# Patient Record
Sex: FEMALE | Race: WHITE | URBAN - METROPOLITAN AREA
[De-identification: names, ages, dates, MRNs, and addresses within clinical notes are randomized per-mention and may not be internally consistent; named-entity substitution may affect disease eponyms.]

---

## 2017-11-21 NOTE — TELEPHONE ENCOUNTER
Called pt and l/m regarding her message this morning. Advised pt Dr. Kathy Castro could not refill medication due to being a new office. Pt would need to contact pcp. Thanks!

## 2018-01-22 ENCOUNTER — OFFICE VISIT (OUTPATIENT)
Dept: ENDOCRINOLOGY | Age: 33
End: 2018-01-22

## 2018-01-22 VITALS
SYSTOLIC BLOOD PRESSURE: 98 MMHG | HEART RATE: 53 BPM | DIASTOLIC BLOOD PRESSURE: 62 MMHG | OXYGEN SATURATION: 99 % | WEIGHT: 147.2 LBS | BODY MASS INDEX: 21.8 KG/M2 | HEIGHT: 69 IN

## 2018-01-22 DIAGNOSIS — E03.9 ACQUIRED HYPOTHYROIDISM: Primary | ICD-10-CM

## 2018-01-22 PROCEDURE — G8427 DOCREV CUR MEDS BY ELIG CLIN: HCPCS | Performed by: INTERNAL MEDICINE

## 2018-01-22 PROCEDURE — 1036F TOBACCO NON-USER: CPT | Performed by: INTERNAL MEDICINE

## 2018-01-22 PROCEDURE — 99202 OFFICE O/P NEW SF 15 MIN: CPT | Performed by: INTERNAL MEDICINE

## 2018-01-22 PROCEDURE — G8484 FLU IMMUNIZE NO ADMIN: HCPCS | Performed by: INTERNAL MEDICINE

## 2018-01-22 PROCEDURE — G8420 CALC BMI NORM PARAMETERS: HCPCS | Performed by: INTERNAL MEDICINE

## 2018-01-22 RX ORDER — LEVOTHYROXINE SODIUM 137 UG/1
137 TABLET ORAL DAILY
COMMUNITY
End: 2018-02-07 | Stop reason: DRUGHIGH

## 2018-01-22 ASSESSMENT — PATIENT HEALTH QUESTIONNAIRE - PHQ9
SUM OF ALL RESPONSES TO PHQ9 QUESTIONS 1 & 2: 0
2. FEELING DOWN, DEPRESSED OR HOPELESS: 0
1. LITTLE INTEREST OR PLEASURE IN DOING THINGS: 0
SUM OF ALL RESPONSES TO PHQ QUESTIONS 1-9: 0

## 2018-01-22 NOTE — PROGRESS NOTES
vision, no eye redness, no eye irritation, no double vision  Ears, nose, throat: no nasal congestion, no sore throat, no earache, no decrease in hearing, no hoarseness, no dry mouth, no sinus problems, no difficulty swallowing, no neck lumps, no dental problems, no mouth sores, no ringing in ears  Pulmonary: no shortness of breath, no wheezing, no dyspnea on exertion, no cough  Cardiovascular: no chest pain, no lower extremity edema, no orthopnea, no intermittent leg claudication, no palpitations  Gastrointestinal: no abdominal pain, no nausea, no vomiting, no diarrhea, no constipation, no heartburn, no bloating  Genitourinary: no dysuria, no urinary incontinence, no urinary hesitancy, no change in urinary frequency, no feelings of urinary urgency, no nocturia  Musculoskeletal: no joint swelling, no joint stiffness, no joint pain, no muscle cramps, no muscle pain, no bone pain, no fractures  Integument/Breast: no hair loss, but no skin rashes, no skin lesions, no itching, no dry skin, no breast pain, no breast mass, no skin hives, no skin discoloration, no nipple discharge  Neurological: no numbness, no tingling, no weakness, no confusion, no headaches, no dizziness, no fainting, no tremors, no decrease in memory, no balance problems  Psychiatric: no anxiety, no depression, no insomnia, no change in personality, no emotional problems, no stress  Hematologic/Lymphatic: no tendency for easy bleeding, no swollen lymph nodes, no tendency for easy bruising  Immunology: no seasonal allergies, no frequent infections, no frequent illnesses  Endocrine: no temperature intolerance, no hot flashes, no hand tremor    OBJECTIVE:   BP 98/62 (Site: Left Arm, Position: Sitting, Cuff Size: Medium Adult)   Pulse 53   Ht 5' 9\" (1.753 m)   Wt 147 lb 3.2 oz (66.8 kg)   LMP  (LMP Unknown)   SpO2 99%   Breastfeeding?  No   BMI 21.74 kg/m²   Wt Readings from Last 3 Encounters:   01/22/18 147 lb 3.2 oz (66.8 kg)       Physical

## 2018-02-02 ENCOUNTER — TELEPHONE (OUTPATIENT)
Dept: ENDOCRINOLOGY | Age: 33
End: 2018-02-02

## 2018-02-07 ENCOUNTER — TELEPHONE (OUTPATIENT)
Dept: ENDOCRINOLOGY | Age: 33
End: 2018-02-07

## 2018-02-07 DIAGNOSIS — E03.9 ACQUIRED HYPOTHYROIDISM: Primary | ICD-10-CM

## 2018-02-07 RX ORDER — LEVOTHYROXINE SODIUM 0.15 MG/1
150 TABLET ORAL DAILY
Qty: 30 TABLET | Refills: 3 | Status: SHIPPED | OUTPATIENT
Start: 2018-02-07 | End: 2018-03-19 | Stop reason: SDUPTHER

## 2018-03-19 ENCOUNTER — OFFICE VISIT (OUTPATIENT)
Dept: ENDOCRINOLOGY | Age: 33
End: 2018-03-19

## 2018-03-19 VITALS
HEART RATE: 54 BPM | OXYGEN SATURATION: 100 % | WEIGHT: 147.4 LBS | BODY MASS INDEX: 21.83 KG/M2 | SYSTOLIC BLOOD PRESSURE: 118 MMHG | DIASTOLIC BLOOD PRESSURE: 72 MMHG | HEIGHT: 69 IN

## 2018-03-19 DIAGNOSIS — E03.9 ACQUIRED HYPOTHYROIDISM: ICD-10-CM

## 2018-03-19 PROCEDURE — 99213 OFFICE O/P EST LOW 20 MIN: CPT | Performed by: INTERNAL MEDICINE

## 2018-03-19 RX ORDER — LEVOTHYROXINE SODIUM 0.15 MG/1
TABLET ORAL
Qty: 40 TABLET | Refills: 3 | Status: SHIPPED | OUTPATIENT
Start: 2018-03-19 | End: 2018-06-19 | Stop reason: SDUPTHER

## 2018-03-19 ASSESSMENT — PATIENT HEALTH QUESTIONNAIRE - PHQ9
SUM OF ALL RESPONSES TO PHQ QUESTIONS 1-9: 0
1. LITTLE INTEREST OR PLEASURE IN DOING THINGS: 0
SUM OF ALL RESPONSES TO PHQ9 QUESTIONS 1 & 2: 0
2. FEELING DOWN, DEPRESSED OR HOPELESS: 0

## 2018-06-18 ENCOUNTER — TELEPHONE (OUTPATIENT)
Dept: ENDOCRINOLOGY | Age: 33
End: 2018-06-18

## 2018-06-19 ENCOUNTER — OFFICE VISIT (OUTPATIENT)
Dept: ENDOCRINOLOGY | Age: 33
End: 2018-06-19

## 2018-06-19 VITALS
HEIGHT: 69 IN | DIASTOLIC BLOOD PRESSURE: 62 MMHG | SYSTOLIC BLOOD PRESSURE: 102 MMHG | HEART RATE: 58 BPM | WEIGHT: 148.8 LBS | BODY MASS INDEX: 22.04 KG/M2 | OXYGEN SATURATION: 98 %

## 2018-06-19 DIAGNOSIS — E03.9 ACQUIRED HYPOTHYROIDISM: Primary | ICD-10-CM

## 2018-06-19 PROCEDURE — 99213 OFFICE O/P EST LOW 20 MIN: CPT | Performed by: INTERNAL MEDICINE

## 2018-06-19 RX ORDER — LEVOTHYROXINE SODIUM 0.15 MG/1
TABLET ORAL
Qty: 40 TABLET | Refills: 3 | Status: SHIPPED | OUTPATIENT
Start: 2018-06-19 | End: 2018-09-24 | Stop reason: DRUGHIGH

## 2018-06-19 ASSESSMENT — PATIENT HEALTH QUESTIONNAIRE - PHQ9
2. FEELING DOWN, DEPRESSED OR HOPELESS: 0
SUM OF ALL RESPONSES TO PHQ9 QUESTIONS 1 & 2: 0
1. LITTLE INTEREST OR PLEASURE IN DOING THINGS: 0
SUM OF ALL RESPONSES TO PHQ QUESTIONS 1-9: 0

## 2018-09-24 ENCOUNTER — OFFICE VISIT (OUTPATIENT)
Dept: ENDOCRINOLOGY | Age: 33
End: 2018-09-24
Payer: COMMERCIAL

## 2018-09-24 VITALS
HEART RATE: 49 BPM | DIASTOLIC BLOOD PRESSURE: 76 MMHG | BODY MASS INDEX: 22.31 KG/M2 | WEIGHT: 150.6 LBS | OXYGEN SATURATION: 99 % | HEIGHT: 69 IN | SYSTOLIC BLOOD PRESSURE: 108 MMHG

## 2018-09-24 DIAGNOSIS — E03.9 ACQUIRED HYPOTHYROIDISM: Primary | ICD-10-CM

## 2018-09-24 PROCEDURE — 99213 OFFICE O/P EST LOW 20 MIN: CPT | Performed by: INTERNAL MEDICINE

## 2018-09-24 RX ORDER — LEVOTHYROXINE SODIUM 0.15 MG/1
TABLET ORAL
Qty: 40 TABLET | Refills: 3 | Status: CANCELLED | OUTPATIENT
Start: 2018-09-24

## 2018-09-24 RX ORDER — LEVOTHYROXINE SODIUM 175 UG/1
175 TABLET ORAL
Qty: 30 TABLET | Refills: 3 | Status: SHIPPED | OUTPATIENT
Start: 2018-09-24 | End: 2018-10-31 | Stop reason: SDUPTHER

## 2018-09-24 ASSESSMENT — PATIENT HEALTH QUESTIONNAIRE - PHQ9
1. LITTLE INTEREST OR PLEASURE IN DOING THINGS: 0
SUM OF ALL RESPONSES TO PHQ QUESTIONS 1-9: 0
SUM OF ALL RESPONSES TO PHQ QUESTIONS 1-9: 0
SUM OF ALL RESPONSES TO PHQ9 QUESTIONS 1 & 2: 0
2. FEELING DOWN, DEPRESSED OR HOPELESS: 0

## 2018-09-24 NOTE — PROGRESS NOTES
SUBJECTIVE:  Kendrick Duffy is a 26 y.o. female who is here for hypothyroidism. 1. Acquired hypothyroidism    This started in 2011. Patient was diagnosed with hypothyroidism. The problem has been unchanged. Patient started medication in 2011. Currently patient is on: levothyroxine. Misses  0 doses a month. Current complaints: fatigue. Moderate. History of obstructive symptoms: difficulty swallowing No, changes in voice/hoarseness No.    History of radiation to patient's neck: No  Resent iodine exposure: No  Family history includes hypothyroidism. Family history of thyroid cancer: No    Saw reproductive endocrinologist.  Cost very expensive. Past Medical History:   Diagnosis Date    Hypothyroidism      Patient Active Problem List    Diagnosis Date Noted    Acquired hypothyroidism 03/19/2018     Past Surgical History:   Procedure Laterality Date    FERTILITY SURGERY       Family History   Problem Relation Age of Onset    Thyroid Disease Mother     No Known Problems Father     No Known Problems Brother     No Known Problems Son      Social History     Social History    Marital status:      Spouse name: N/A    Number of children: N/A    Years of education: N/A     Social History Main Topics    Smoking status: Never Smoker    Smokeless tobacco: Never Used    Alcohol use No    Drug use: No    Sexual activity: Yes     Partners: Male     Other Topics Concern    None     Social History Narrative    None     Current Outpatient Prescriptions   Medication Sig Dispense Refill    levothyroxine (SYNTHROID) 175 MCG tablet Take 1 tablet by mouth every morning (before breakfast) 30 tablet 3     No current facility-administered medications for this visit.       No Known Allergies  Family Status   Relation Status    Mother Alive    Father Alive    Brother Alive    Son Alive       Review of Systems:  Constitutional: has fatigue, no fever, no recent weight gain, no recent weight loss, no changes in appetite  Eyes: no eye pain, no change in vision, no eye redness, no eye irritation, no double vision  Ears, nose, throat: no nasal congestion, no sore throat, no earache, no decrease in hearing, no hoarseness, no dry mouth, no sinus problems, no difficulty swallowing, no neck lumps, no dental problems, no mouth sores, no ringing in ears  Pulmonary: no shortness of breath, no wheezing, no dyspnea on exertion, no cough  Cardiovascular: no chest pain, no lower extremity edema, no orthopnea, no intermittent leg claudication, no palpitations  Gastrointestinal: no abdominal pain, no nausea, no vomiting, no diarrhea, no constipation, no heartburn, no bloating  Genitourinary: no dysuria, no urinary incontinence, no urinary hesitancy, no change in urinary frequency, no feelings of urinary urgency, no nocturia  Musculoskeletal: no joint swelling, no joint stiffness, no joint pain, no muscle cramps, no muscle pain, no bone pain, no fractures  Integument/Breast: no hair loss, but no skin rashes, no skin lesions, no itching, no dry skin, no breast pain, no breast mass, no skin hives, no skin discoloration, no nipple discharge  Neurological: no numbness, no tingling, no weakness, no confusion, no headaches, no dizziness, no fainting, no tremors, no decrease in memory, no balance problems  Psychiatric: no anxiety, no depression, no insomnia  Hematologic/Lymphatic: no tendency for easy bleeding, no swollen lymph nodes, no tendency for easy bruising  Immunology: no seasonal allergies, no frequent infections, no frequent illnesses  Endocrine: no temperature intolerance    OBJECTIVE:   /76 (Site: Left Upper Arm, Position: Sitting, Cuff Size: Medium Adult)   Pulse (!) 49   Ht 5' 9\" (1.753 m)   Wt 150 lb 9.6 oz (68.3 kg)   LMP 09/10/2018   SpO2 99%   BMI 22.24 kg/m²   Wt Readings from Last 3 Encounters:   09/24/18 150 lb 9.6 oz (68.3 kg)   06/19/18 148 lb 12.8 oz (67.5 kg)   03/19/18 147 lb 6.4 oz (66.9 kg) Physical Exam:  Constitutional: no acute distress, well appearing, well nourished  Psychiatric: oriented to person, place and time, judgement, insight and normal, recent and remote memory and intact and mood, affect are normal  Skin: skin and subcutaneous tissue is normal without mass, normal turgor  Head and Face: examination of head and face revealed no abnormalities  Eyes: no lid or conjunctival swelling, no erythema or discharge, pupils are normal, equal, round, and reactive to light  Ears/Nose: external inspection of ears and nose revealed no abnormalities, hearing is grossly normal  Oropharynx/Mouth/Face: lips, tongue and gums are normal with no lesions, the voice quality was normal  Neck: neck is supple and symmetric, with midline trachea and no masses, thyroid is normal  Lymphatics: normal cervical lymph nodes, normal supraclavicular nodes  Pulmonary: no increased work of breathing or signs of respiratory distress, lungs are clear to auscultation  Cardiovascular: normal heart rate and rhythm, normal S1 and S2, no murmurs and pedal pulses and 2+ bilaterally, No edema  Abdomen: abdomen is soft, non-tender with no masses  Musculoskeletal: normal gait and station, exam of the digits and nails are normal  Neurological: normal coordination, normal general cortical function    Lab Review:  No results found for: TSH  No results found for: FREET4     ASSESSMENT/PLAN:  1. Acquired hypothyroidism  Worsening of TSH, unclear reason. Increase levothyroxine to 1 tab daily.   - T4, Free; Future  - TSH without Reflex;  Future    Reviewed and/or ordered clinical lab results Yes  Reviewed and/or ordered radiology tests No   Reviewed and/or ordered other diagnostic tests No  Discussed test results with performing physician No  Independently reviewed image, tracing, or specimen No  Made a decision to obtain old records No  Reviewed and summarized old records No  Obtained history from other than patient No    [employer] was counseled regarding symptoms of hypothyroidism diagnosis, course and complications of disease if inadequately treated, side effects of medications, diagnosis, treatment options, labs, imaging and other studies and treatment targets and goals, TSH target. She understands instructions and counseling. Total time 15 % min, more than 50% was counseling. Return in about 5 weeks (around 10/29/2018) for thyroid problems.

## 2018-10-31 ENCOUNTER — OFFICE VISIT (OUTPATIENT)
Dept: ENDOCRINOLOGY | Age: 33
End: 2018-10-31
Payer: COMMERCIAL

## 2018-10-31 VITALS
BODY MASS INDEX: 22.57 KG/M2 | WEIGHT: 152.4 LBS | HEIGHT: 69 IN | OXYGEN SATURATION: 99 % | DIASTOLIC BLOOD PRESSURE: 71 MMHG | SYSTOLIC BLOOD PRESSURE: 119 MMHG | HEART RATE: 65 BPM

## 2018-10-31 DIAGNOSIS — E03.9 ACQUIRED HYPOTHYROIDISM: Primary | ICD-10-CM

## 2018-10-31 PROBLEM — K90.9 IMPAIRED INTESTINAL ABSORPTION: Status: ACTIVE | Noted: 2018-10-31

## 2018-10-31 PROCEDURE — 99213 OFFICE O/P EST LOW 20 MIN: CPT | Performed by: INTERNAL MEDICINE

## 2018-10-31 RX ORDER — LEVOTHYROXINE SODIUM 0.15 MG/1
150 TABLET ORAL EVERY OTHER DAY
Qty: 30 TABLET | Refills: 3 | Status: SHIPPED | OUTPATIENT
Start: 2018-10-31 | End: 2019-01-10 | Stop reason: SDUPTHER

## 2018-10-31 RX ORDER — LEVOTHYROXINE SODIUM 175 UG/1
175 TABLET ORAL EVERY OTHER DAY
Qty: 30 TABLET | Refills: 3 | Status: SHIPPED | OUTPATIENT
Start: 2018-10-31 | End: 2019-01-10 | Stop reason: SDUPTHER

## 2019-01-10 ENCOUNTER — OFFICE VISIT (OUTPATIENT)
Dept: ENDOCRINOLOGY | Age: 34
End: 2019-01-10
Payer: COMMERCIAL

## 2019-01-10 VITALS
HEIGHT: 69 IN | SYSTOLIC BLOOD PRESSURE: 130 MMHG | HEART RATE: 63 BPM | WEIGHT: 149.2 LBS | DIASTOLIC BLOOD PRESSURE: 73 MMHG | BODY MASS INDEX: 22.1 KG/M2

## 2019-01-10 DIAGNOSIS — E03.9 ACQUIRED HYPOTHYROIDISM: Primary | ICD-10-CM

## 2019-01-10 PROCEDURE — 99213 OFFICE O/P EST LOW 20 MIN: CPT | Performed by: INTERNAL MEDICINE

## 2019-01-10 RX ORDER — LEVOTHYROXINE SODIUM 0.15 MG/1
TABLET ORAL
Qty: 30 TABLET | Refills: 3
Start: 2019-01-10 | End: 2019-07-30 | Stop reason: DRUGHIGH

## 2019-01-10 RX ORDER — LEVOTHYROXINE SODIUM 175 UG/1
TABLET ORAL
Qty: 30 TABLET | Refills: 3
Start: 2019-01-10 | End: 2019-07-30 | Stop reason: SDUPTHER

## 2019-04-12 ENCOUNTER — OFFICE VISIT (OUTPATIENT)
Dept: ENDOCRINOLOGY | Age: 34
End: 2019-04-12
Payer: COMMERCIAL

## 2019-04-12 VITALS
DIASTOLIC BLOOD PRESSURE: 70 MMHG | WEIGHT: 150.2 LBS | SYSTOLIC BLOOD PRESSURE: 121 MMHG | HEART RATE: 65 BPM | BODY MASS INDEX: 22.25 KG/M2 | HEIGHT: 69 IN

## 2019-04-12 DIAGNOSIS — E03.9 ACQUIRED HYPOTHYROIDISM: Primary | ICD-10-CM

## 2019-04-12 PROCEDURE — 99213 OFFICE O/P EST LOW 20 MIN: CPT | Performed by: INTERNAL MEDICINE

## 2019-04-12 NOTE — PROGRESS NOTES
SUBJECTIVE:  Gerardo Walter is a 37 y.o. female who is here for hypothyroidism. 1. Acquired hypothyroidism    This started in 2011. Patient was diagnosed with hypothyroidism. The problem has been unchanged. Patient started medication in 2011. Currently patient is on: levothyroxine. Misses  0 doses a month. Current complaints: fatigue, moderate. Busy. History of obstructive symptoms: difficulty swallowing No, changes in voice/hoarseness No.    History of radiation to patient's neck: No  Resent iodine exposure: No  Family history includes hypothyroidism. Family history of thyroid cancer: No    Saw reproductive endocrinologist.  Cost very expensive. On hold at this time.       Past Medical History:   Diagnosis Date    Hypothyroidism      Patient Active Problem List    Diagnosis Date Noted    Impaired intestinal absorption 10/31/2018    Acquired hypothyroidism 03/19/2018     Past Surgical History:   Procedure Laterality Date    FERTILITY SURGERY       Family History   Problem Relation Age of Onset    Thyroid Disease Mother     No Known Problems Father     No Known Problems Brother     No Known Problems Son      Social History     Socioeconomic History    Marital status:      Spouse name: None    Number of children: None    Years of education: None    Highest education level: None   Occupational History    None   Social Needs    Financial resource strain: None    Food insecurity:     Worry: None     Inability: None    Transportation needs:     Medical: None     Non-medical: None   Tobacco Use    Smoking status: Never Smoker    Smokeless tobacco: Never Used   Substance and Sexual Activity    Alcohol use: No    Drug use: No    Sexual activity: Yes     Partners: Male   Lifestyle    Physical activity:     Days per week: None     Minutes per session: None    Stress: None   Relationships    Social connections:     Talks on phone: None     Gets together: None     Attends Yazidi service: None     Active member of club or organization: None     Attends meetings of clubs or organizations: None     Relationship status: None    Intimate partner violence:     Fear of current or ex partner: None     Emotionally abused: None     Physically abused: None     Forced sexual activity: None   Other Topics Concern    None   Social History Narrative    None     Current Outpatient Medications   Medication Sig Dispense Refill    levothyroxine (SYNTHROID) 150 MCG tablet 1 tablet 3 days a week 30 tablet 3    levothyroxine (SYNTHROID) 175 MCG tablet Take 1 tablet 4 days a week 30 tablet 3     No current facility-administered medications for this visit.       No Known Allergies  Family Status   Relation Name Status    Mother  Alive    Father  Alive    Brother  Alive    Son  Alive       Review of Systems:  Constitutional: has fatigue, no fever, no recent weight gain, no recent weight loss, no changes in appetite  Eyes: no eye pain, no change in vision, no eye redness, no eye irritation, no double vision  Ears, nose, throat: no nasal congestion, no sore throat, no earache, no decrease in hearing, no hoarseness, no dry mouth, no sinus problems, no difficulty swallowing, no neck lumps, no dental problems, no mouth sores, no ringing in ears  Pulmonary: no shortness of breath, no wheezing, no dyspnea on exertion, no cough  Cardiovascular: no chest pain, no lower extremity edema, no orthopnea, no intermittent leg claudication, no palpitations  Gastrointestinal: no abdominal pain, no nausea, no vomiting, no diarrhea, no constipation, no heartburn, no bloating  Genitourinary: no dysuria, no urinary incontinence, no urinary hesitancy, no change in urinary frequency, no feelings of urinary urgency, no nocturia  Musculoskeletal: no joint swelling, no joint stiffness, no joint pain, no muscle cramps, no muscle pain, no bone pain, no fractures  Integument/Breast: no hair loss, but no skin rashes, no skin lesions, no itching, no dry skin  Neurological: no numbness, no tingling, no weakness, no confusion, no headaches, no dizziness, no fainting, no tremors, no decrease in memory, no balance problems  Psychiatric: no anxiety, no depression, no insomnia  Hematologic/Lymphatic: no tendency for easy bleeding, no swollen lymph nodes, no tendency for easy bruising  Immunology: no seasonal allergies, no frequent infections, no frequent illnesses  Endocrine: no temperature intolerance    OBJECTIVE:   /70 (Site: Left Upper Arm, Position: Sitting, Cuff Size: Medium Adult)   Pulse 65   Ht 5' 9\" (1.753 m)   Wt 150 lb 3.2 oz (68.1 kg)   BMI 22.18 kg/m²   Wt Readings from Last 3 Encounters:   04/12/19 150 lb 3.2 oz (68.1 kg)   01/10/19 149 lb 3.2 oz (67.7 kg)   10/31/18 152 lb 6.4 oz (69.1 kg)       Physical Exam:  Constitutional: no acute distress, well appearing, well nourished  Psychiatric: oriented to person, place and time, judgement, insight and normal, recent and remote memory and intact and mood, affect are normal  Skin: skin and subcutaneous tissue is normal without mass, normal turgor  Head and Face: examination of head and face revealed no abnormalities  Eyes: no lid or conjunctival swelling, no erythema or discharge, pupils are normal, equal, round, and reactive to light  Ears/Nose: external inspection of ears and nose revealed no abnormalities, hearing is grossly normal  Oropharynx/Mouth/Face: lips, tongue and gums are normal with no lesions, the voice quality was normal  Neck: neck is supple and symmetric, with midline trachea and no masses, thyroid is normal  Lymphatics: normal cervical lymph nodes, normal supraclavicular nodes  Pulmonary: no increased work of breathing or signs of respiratory distress, lungs are clear to auscultation  Cardiovascular: normal heart rate and rhythm, normal S1 and S2, no murmurs and pedal pulses and 2+ bilaterally, No edema  Abdomen: abdomen is soft, non-tender with no

## 2019-07-30 ENCOUNTER — OFFICE VISIT (OUTPATIENT)
Dept: ENDOCRINOLOGY | Age: 34
End: 2019-07-30
Payer: COMMERCIAL

## 2019-07-30 VITALS
BODY MASS INDEX: 22.42 KG/M2 | DIASTOLIC BLOOD PRESSURE: 67 MMHG | OXYGEN SATURATION: 99 % | HEIGHT: 69 IN | WEIGHT: 151.4 LBS | SYSTOLIC BLOOD PRESSURE: 114 MMHG | HEART RATE: 55 BPM

## 2019-07-30 DIAGNOSIS — E03.9 ACQUIRED HYPOTHYROIDISM: Primary | ICD-10-CM

## 2019-07-30 DIAGNOSIS — R73.01 IFG (IMPAIRED FASTING GLUCOSE): ICD-10-CM

## 2019-07-30 PROCEDURE — 99214 OFFICE O/P EST MOD 30 MIN: CPT | Performed by: INTERNAL MEDICINE

## 2019-07-30 RX ORDER — LEVOTHYROXINE SODIUM 175 UG/1
175 TABLET ORAL DAILY
Qty: 30 TABLET | Refills: 3 | Status: SHIPPED | OUTPATIENT
Start: 2019-07-30 | End: 2019-09-30 | Stop reason: SDUPTHER

## 2019-07-30 RX ORDER — LEVOTHYROXINE SODIUM 0.15 MG/1
TABLET ORAL
Qty: 30 TABLET | Refills: 3 | Status: CANCELLED | OUTPATIENT
Start: 2019-07-30

## 2019-09-25 ENCOUNTER — TELEPHONE (OUTPATIENT)
Dept: ENDOCRINOLOGY | Age: 34
End: 2019-09-25

## 2019-09-29 NOTE — PROGRESS NOTES
SUBJECTIVE:  Marty Gotti is a 37 y.o. female who is here for hypothyroidism. 1. Acquired hypothyroidism    This started in 2011. Patient was diagnosed with hypothyroidism. The problem has been unchanged. Patient started medication in 2011. Currently patient is on: levothyroxine. Misses  0 doses a month. Current complaints: fatigue, moderate. Busy. History of obstructive symptoms: difficulty swallowing No, changes in voice/hoarseness No.    History of radiation to patient's neck: No  Resent iodine exposure: No  Family history includes hypothyroidism. Family history of thyroid cancer: No    Saw reproductive endocrinologist.  Cost very expensive. On hold at this time. Has 10 yo son.     2. IFG  Eats healthy    Past Medical History:   Diagnosis Date    Hypothyroidism      Patient Active Problem List    Diagnosis Date Noted    IFG (impaired fasting glucose) 07/30/2019    Impaired intestinal absorption 10/31/2018    Acquired hypothyroidism 03/19/2018     Past Surgical History:   Procedure Laterality Date    FERTILITY SURGERY       Family History   Problem Relation Age of Onset    Thyroid Disease Mother     No Known Problems Father     No Known Problems Brother     No Known Problems Son      Social History     Socioeconomic History    Marital status:      Spouse name: None    Number of children: None    Years of education: None    Highest education level: None   Occupational History    None   Social Needs    Financial resource strain: None    Food insecurity:     Worry: None     Inability: None    Transportation needs:     Medical: None     Non-medical: None   Tobacco Use    Smoking status: Never Smoker    Smokeless tobacco: Never Used   Substance and Sexual Activity    Alcohol use: No    Drug use: No    Sexual activity: Yes     Partners: Male   Lifestyle    Physical activity:     Days per week: None     Minutes per session: None    Stress: None   Relationships    Social connections:     Talks on phone: None     Gets together: None     Attends Taoist service: None     Active member of club or organization: None     Attends meetings of clubs or organizations: None     Relationship status: None    Intimate partner violence:     Fear of current or ex partner: None     Emotionally abused: None     Physically abused: None     Forced sexual activity: None   Other Topics Concern    None   Social History Narrative    None     Current Outpatient Medications   Medication Sig Dispense Refill    levothyroxine (SYNTHROID) 175 MCG tablet 1 tablet 6 days a week, 1/2 tablet 1 day a week 30 tablet 3     No current facility-administered medications for this visit.       No Known Allergies  Family Status   Relation Name Status    Mother  Alive    Father  Alive    Brother  Alive    Son  Alive       Review of Systems:  Constitutional: has fatigue, no fever, no recent weight gain, no recent weight loss, no changes in appetite  Eyes: no eye pain, no change in vision, no eye redness, no eye irritation, no double vision  Ears, nose, throat: no nasal congestion, no sore throat, no earache, no decrease in hearing, no hoarseness, no dry mouth, no sinus problems, no difficulty swallowing, no neck lumps, no dental problems, no mouth sores, no ringing in ears  Pulmonary: no shortness of breath, no wheezing, no dyspnea on exertion, no cough  Cardiovascular: no chest pain, no lower extremity edema, no orthopnea, no intermittent leg claudication, no palpitations  Gastrointestinal: no abdominal pain, no nausea, no vomiting, no diarrhea, no constipation, no heartburn, no bloating  Genitourinary: no dysuria, no urinary incontinence, no urinary hesitancy, no change in urinary frequency, no feelings of urinary urgency, no nocturia  Musculoskeletal: no joint swelling, no joint stiffness, no joint pain, no muscle cramps, no muscle pain, no bone pain, no fractures  Integument/Breast: no hair loss, but no skin rashes, no skin lesions, no itching, no dry skin  Neurological: no numbness, no tingling, no weakness, no confusion, no headaches, no dizziness, no fainting, no tremors, no decrease in memory, no balance problems  Psychiatric: no anxiety, no depression, no insomnia  Hematologic/Lymphatic: no tendency for easy bleeding, no swollen lymph nodes, no tendency for easy bruising  Immunology: no seasonal allergies, no frequent infections, no frequent illnesses  Endocrine: no temperature intolerance    OBJECTIVE:   /73 (Site: Left Upper Arm, Position: Sitting, Cuff Size: Medium Adult)   Pulse 62   Ht 5' 9\" (1.753 m)   Wt 150 lb (68 kg)   SpO2 100%   BMI 22.15 kg/m²   Wt Readings from Last 3 Encounters:   09/30/19 150 lb (68 kg)   07/30/19 151 lb 6.4 oz (68.7 kg)   04/12/19 150 lb 3.2 oz (68.1 kg)       Physical Exam:  Constitutional: no acute distress, well appearing, well nourished  Psychiatric: oriented to person, place and time, judgement, insight and normal, recent and remote memory and intact and mood, affect are normal  Skin: skin and subcutaneous tissue is normal without mass, normal turgor  Head and Face: examination of head and face revealed no abnormalities  Eyes: no lid or conjunctival swelling, no erythema or discharge, pupils are normal, equal, round, and reactive to light  Ears/Nose: external inspection of ears and nose revealed no abnormalities, hearing is grossly normal  Oropharynx/Mouth/Face: lips, tongue and gums are normal with no lesions, the voice quality was normal  Neck: neck is supple and symmetric, with midline trachea and no masses, thyroid is normal  Lymphatics: normal cervical lymph nodes, normal supraclavicular nodes  Pulmonary: no increased work of breathing or signs of respiratory distress, lungs are clear to auscultation  Cardiovascular: normal heart rate and rhythm, normal S1 and S2, no murmurs and pedal pulses and 2+ bilaterally, No edema  Abdomen: abdomen is soft, non-tender with

## 2019-09-30 ENCOUNTER — OFFICE VISIT (OUTPATIENT)
Dept: ENDOCRINOLOGY | Age: 34
End: 2019-09-30
Payer: COMMERCIAL

## 2019-09-30 VITALS
OXYGEN SATURATION: 100 % | BODY MASS INDEX: 22.22 KG/M2 | HEIGHT: 69 IN | DIASTOLIC BLOOD PRESSURE: 73 MMHG | SYSTOLIC BLOOD PRESSURE: 123 MMHG | WEIGHT: 150 LBS | HEART RATE: 62 BPM

## 2019-09-30 DIAGNOSIS — E03.9 ACQUIRED HYPOTHYROIDISM: Primary | ICD-10-CM

## 2019-09-30 DIAGNOSIS — R73.01 IFG (IMPAIRED FASTING GLUCOSE): ICD-10-CM

## 2019-09-30 PROCEDURE — 99214 OFFICE O/P EST MOD 30 MIN: CPT | Performed by: INTERNAL MEDICINE

## 2019-09-30 RX ORDER — LEVOTHYROXINE SODIUM 175 UG/1
TABLET ORAL
Qty: 30 TABLET | Refills: 3
Start: 2019-09-30 | End: 2020-01-10 | Stop reason: SDUPTHER

## 2020-01-10 ENCOUNTER — OFFICE VISIT (OUTPATIENT)
Dept: ENDOCRINOLOGY | Age: 35
End: 2020-01-10
Payer: COMMERCIAL

## 2020-01-10 VITALS
BODY MASS INDEX: 21.8 KG/M2 | HEIGHT: 69 IN | WEIGHT: 147.2 LBS | OXYGEN SATURATION: 98 % | HEART RATE: 61 BPM | SYSTOLIC BLOOD PRESSURE: 125 MMHG | DIASTOLIC BLOOD PRESSURE: 76 MMHG

## 2020-01-10 PROCEDURE — 99214 OFFICE O/P EST MOD 30 MIN: CPT | Performed by: INTERNAL MEDICINE

## 2020-01-10 RX ORDER — LEVOTHYROXINE SODIUM 175 UG/1
TABLET ORAL
Qty: 30 TABLET | Refills: 3 | Status: SHIPPED | OUTPATIENT
Start: 2020-01-10 | End: 2020-06-08

## 2020-01-10 NOTE — PROGRESS NOTES
SUBJECTIVE:  Asia Sultana is a 38 y.o. female who is here for hypothyroidism. 1. Acquired hypothyroidism    This started in 2011. Patient was diagnosed with hypothyroidism. The problem has been unchanged. Patient started medication in 2011. Currently patient is on: levothyroxine. Misses  0 doses a month. Current complaints: fatigue, moderate. Busy. History of obstructive symptoms: difficulty swallowing No, changes in voice/hoarseness No.    History of radiation to patient's neck: No  Resent iodine exposure: No  Family history includes hypothyroidism. Family history of thyroid cancer: No    Saw reproductive endocrinologist.  Cost very expensive. On hold at this time. Has 10 yo son.     2. IFG  Eats healthy    Past Medical History:   Diagnosis Date    Hypothyroidism      Patient Active Problem List    Diagnosis Date Noted    IFG (impaired fasting glucose) 07/30/2019    Impaired intestinal absorption 10/31/2018    Acquired hypothyroidism 03/19/2018     Past Surgical History:   Procedure Laterality Date    FERTILITY SURGERY       Family History   Problem Relation Age of Onset    Thyroid Disease Mother     No Known Problems Father     No Known Problems Brother     No Known Problems Son      Social History     Socioeconomic History    Marital status:      Spouse name: None    Number of children: None    Years of education: None    Highest education level: None   Occupational History    None   Social Needs    Financial resource strain: None    Food insecurity:     Worry: None     Inability: None    Transportation needs:     Medical: None     Non-medical: None   Tobacco Use    Smoking status: Never Smoker    Smokeless tobacco: Never Used   Substance and Sexual Activity    Alcohol use: No    Drug use: No    Sexual activity: Yes     Partners: Male   Lifestyle    Physical activity:     Days per week: None     Minutes per session: None    Stress: None   Relationships    Social connections:     Talks on phone: None     Gets together: None     Attends Taoist service: None     Active member of club or organization: None     Attends meetings of clubs or organizations: None     Relationship status: None    Intimate partner violence:     Fear of current or ex partner: None     Emotionally abused: None     Physically abused: None     Forced sexual activity: None   Other Topics Concern    None   Social History Narrative    None     Current Outpatient Medications   Medication Sig Dispense Refill    levothyroxine (SYNTHROID) 175 MCG tablet 1 tablet 6 days a week 30 tablet 3     No current facility-administered medications for this visit.       No Known Allergies  Family Status   Relation Name Status    Mother  Alive    Father  Alive    Brother  Alive    Son  Alive       Review of Systems:  Constitutional: has fatigue, no fever, no recent weight gain, no recent weight loss, no changes in appetite  Eyes: no eye pain, no change in vision, no eye redness, no eye irritation, no double vision  Ears, nose, throat: no nasal congestion, no sore throat, no earache, no decrease in hearing, no hoarseness, no dry mouth, no sinus problems, no difficulty swallowing, no neck lumps, no dental problems, no mouth sores, no ringing in ears  Pulmonary: no shortness of breath, no wheezing, no dyspnea on exertion, no cough  Cardiovascular: no chest pain, no lower extremity edema, no orthopnea, no intermittent leg claudication, no palpitations  Gastrointestinal: no abdominal pain, no nausea, no vomiting, no diarrhea, no constipation, no heartburn, no bloating  Genitourinary: no dysuria, no urinary incontinence, no urinary hesitancy, no change in urinary frequency, no feelings of urinary urgency, no nocturia  Musculoskeletal: no joint swelling, no joint stiffness, no joint pain, no muscle cramps, no muscle pain  Integument/Breast: no hair loss, but no skin rashes, no skin lesions, no itching, no dry skin  Neurological: no numbness, no tingling, no weakness, no confusion, no headaches, no dizziness, no fainting, no tremors, no decrease in memory, no balance problems  Psychiatric: no anxiety, no depression, no insomnia  Hematologic/Lymphatic: no tendency for easy bleeding, no swollen lymph nodes, no tendency for easy bruising  Immunology: no seasonal allergies, no frequent infections, no frequent illnesses  Endocrine: no temperature intolerance    OBJECTIVE:   /76 (Site: Left Upper Arm, Position: Sitting, Cuff Size: Medium Adult)   Pulse 61   Ht 5' 9\" (1.753 m)   Wt 147 lb 3.2 oz (66.8 kg)   SpO2 98%   BMI 21.74 kg/m²   Wt Readings from Last 3 Encounters:   01/10/20 147 lb 3.2 oz (66.8 kg)   09/30/19 150 lb (68 kg)   07/30/19 151 lb 6.4 oz (68.7 kg)       Physical Exam:  Constitutional: no acute distress, well appearing, well nourished  Psychiatric: oriented to person, place and time, judgement, insight and normal, recent and remote memory and intact and mood, affect are normal  Skin: skin and subcutaneous tissue is normal without mass, normal turgor  Head and Face: examination of head and face revealed no abnormalities  Eyes: no lid or conjunctival swelling, no erythema or discharge, pupils are normal, equal, round, and reactive to light  Ears/Nose: external inspection of ears and nose revealed no abnormalities, hearing is grossly normal  Oropharynx/Mouth/Face: lips, tongue and gums are normal with no lesions, the voice quality was normal  Neck: neck is supple and symmetric, with midline trachea and no masses, thyroid is normal  Lymphatics: normal cervical lymph nodes, normal supraclavicular nodes  Pulmonary: no increased work of breathing or signs of respiratory distress, lungs are clear to auscultation  Cardiovascular: normal heart rate and rhythm, normal S1 and S2, no murmurs and pedal pulses and 2+ bilaterally, No edema  Abdomen: abdomen is soft, non-tender with no masses  Musculoskeletal: normal

## 2020-06-08 ENCOUNTER — VIRTUAL VISIT (OUTPATIENT)
Dept: ENDOCRINOLOGY | Age: 35
End: 2020-06-08
Payer: COMMERCIAL

## 2020-06-08 PROCEDURE — 99213 OFFICE O/P EST LOW 20 MIN: CPT | Performed by: INTERNAL MEDICINE

## 2020-06-08 RX ORDER — LEVOTHYROXINE SODIUM 175 UG/1
TABLET ORAL
Qty: 30 TABLET | Refills: 3 | Status: SHIPPED | OUTPATIENT
Start: 2020-06-08 | End: 2020-08-12

## 2020-06-08 NOTE — PROGRESS NOTES
SUBJECTIVE:  Mehdi Avery is a 38 y.o. female who is here for hypothyroidism. 2020    TELEHEALTH EVALUATION -- Audio/Visual ([facility])    Patient provided verbal consent to use the video visit. HPI:    Mehdi Avery (:  1985) has requested an audio/video evaluation for the following concern(s):    1. Acquired hypothyroidism    This started in . Patient was diagnosed with hypothyroidism. The problem has been unchanged. Patient started medication in . Currently patient is on: levothyroxine. Misses  0 doses a month. Current complaints: fatigue, moderate. History of obstructive symptoms: difficulty swallowing No, changes in voice/hoarseness No.    History of radiation to patient's neck: No  Resent iodine exposure: No  Family history includes hypothyroidism. Family history of thyroid cancer: No    Saw reproductive endocrinologist.  Cost very expensive. On hold at this time. Has 8 yo son.     2. IFG  Eats healthy    Past Medical History:   Diagnosis Date    Hypothyroidism      Patient Active Problem List    Diagnosis Date Noted    IFG (impaired fasting glucose) 2019    Impaired intestinal absorption 10/31/2018    Acquired hypothyroidism 2018     Past Surgical History:   Procedure Laterality Date    FERTILITY SURGERY       Family History   Problem Relation Age of Onset    Thyroid Disease Mother     No Known Problems Father     No Known Problems Brother     No Known Problems Son      Social History     Socioeconomic History    Marital status:      Spouse name: None    Number of children: None    Years of education: None    Highest education level: None   Occupational History    None   Social Needs    Financial resource strain: None    Food insecurity     Worry: None     Inability: None    Transportation needs     Medical: None     Non-medical: None   Tobacco Use    Smoking status: Never Smoker    Smokeless tobacco: asymmetry, normal general cortical function    Lab Review:  Lab Results   Component Value Date    TSH 2.890 06/04/2020     No results found for: FREET4     ASSESSMENT/PLAN:  1. Acquired hypothyroidism  Worsening, very fluctuating  TSH 0.75-7.4-0.125-0.068-2.89  Increase Levothyroxine to 0.175 mg 6 days a week and 1/2 tablet 1 day a week  - T4, Free; Future  - TSH without Reflex; Future  Gluten free diet    2. IFG  Glucose 828--04  Diet, exercise. HbA1C 5.3-5.4    Reviewed and/or ordered clinical lab results Yes  Reviewed and/or ordered radiology tests No   Reviewed and/or ordered other diagnostic tests No  Discussed test results with performing physician No  Independently reviewed image, tracing, or specimen No  Made a decision to obtain old records No  Reviewed and summarized old records No  Obtained history from other than patient No    Jhon Curiel was counseled regarding symptoms of hypothyroidism, IFG diagnosis, course and complications of disease if inadequately treated, side effects of medications, diagnosis, treatment options, labs, imaging and other studies and treatment targets and goals, TSH target, diet, activity  She understands instructions and counseling. Jhon Curiel is a 38 y.o. female being evaluated by a Virtual Visit (video visit) encounter, including two-way audio and video communication, in lieu of an in-person visit due to coronavirus emergency, to address concerns as mentioned in history and assessment and plan. Patient identification was verified at the start of the visit. I conducted an interview, performed a limited exam by video and educated the patient on my assessment and plan. Due to this being a TeleHealth encounter (During [facility]- public health emergency), evaluation of the following organ systems was limited: Vitals/Constitutional/EENT/Resp/CV/GI//MS/Neuro/Skin/Heme-Lymph-Imm.       Pursuant to the emergency declaration under the [address] and the

## 2020-08-12 ENCOUNTER — TELEPHONE (OUTPATIENT)
Dept: ENDOCRINOLOGY | Age: 35
End: 2020-08-12

## 2020-08-12 RX ORDER — LEVOTHYROXINE SODIUM 0.2 MG/1
TABLET ORAL
Qty: 30 TABLET | Refills: 1 | Status: SHIPPED | OUTPATIENT
Start: 2020-08-12 | End: 2020-10-15 | Stop reason: SDUPTHER

## 2020-08-12 NOTE — TELEPHONE ENCOUNTER
Spoke with patient. She just had lab work done 2 weeks ago at her reproductive doctors office and TSH was 1.18. Because of increased need for thyroid hormone in pregnancy, I recommended to increase levothyroxine to 0.2 mg daily. I sent prescription to pharmacy. I notified patient to let me know if any side effects on medication. She will do lab work in 1 month, she has order for that. I asked patient to schedule appointment in 5 weeks.

## 2020-08-12 NOTE — TELEPHONE ENCOUNTER
PT called she is now pregnant 9 weeks along and wanted to know if Dr. Chase Luna wanted her to complete some lab work.  Her LOV was 6/8/20

## 2020-09-24 ENCOUNTER — OFFICE VISIT (OUTPATIENT)
Dept: ENDOCRINOLOGY | Age: 35
End: 2020-09-24
Payer: COMMERCIAL

## 2020-09-24 VITALS
HEIGHT: 69 IN | BODY MASS INDEX: 23.55 KG/M2 | TEMPERATURE: 97.6 F | HEART RATE: 57 BPM | DIASTOLIC BLOOD PRESSURE: 60 MMHG | WEIGHT: 159 LBS | RESPIRATION RATE: 14 BRPM | OXYGEN SATURATION: 98 % | SYSTOLIC BLOOD PRESSURE: 102 MMHG

## 2020-09-24 PROCEDURE — G8427 DOCREV CUR MEDS BY ELIG CLIN: HCPCS | Performed by: INTERNAL MEDICINE

## 2020-09-24 PROCEDURE — 1036F TOBACCO NON-USER: CPT | Performed by: INTERNAL MEDICINE

## 2020-09-24 PROCEDURE — G8420 CALC BMI NORM PARAMETERS: HCPCS | Performed by: INTERNAL MEDICINE

## 2020-09-24 PROCEDURE — 99213 OFFICE O/P EST LOW 20 MIN: CPT | Performed by: INTERNAL MEDICINE

## 2020-09-24 NOTE — PROGRESS NOTES
SUBJECTIVE:  Rayo Hudson is a 38 y.o. female who is here for hypothyroidism. 1. Acquired hypothyroidism     This started in 2011. Patient was diagnosed with hypothyroidism. The problem has been unchanged. Patient started medication in 2011. Currently patient is on: levothyroxine. Misses  0 doses a month.     Current complaints: fatigue, mild     History of obstructive symptoms: difficulty swallowing No, changes in voice/hoarseness No.     History of radiation to patient's neck: No  Resent iodine exposure: No  Family history includes hypothyroidism. Family history of thyroid cancer: No     Has 8 yo son.     2. Pregnancy  15 weeks pregnant  Due 3/20/2021    3.  IFG  Glucose 291--27    Past Medical History:   Diagnosis Date    Hypothyroidism      Patient Active Problem List    Diagnosis Date Noted    IFG (impaired fasting glucose) 07/30/2019    Impaired intestinal absorption 10/31/2018    Acquired hypothyroidism 03/19/2018     Past Surgical History:   Procedure Laterality Date    FERTILITY SURGERY       Family History   Problem Relation Age of Onset    Thyroid Disease Mother     No Known Problems Father     No Known Problems Brother     No Known Problems Son      Social History     Socioeconomic History    Marital status:      Spouse name: None    Number of children: None    Years of education: None    Highest education level: None   Occupational History    None   Social Needs    Financial resource strain: None    Food insecurity     Worry: None     Inability: None    Transportation needs     Medical: None     Non-medical: None   Tobacco Use    Smoking status: Never Smoker    Smokeless tobacco: Never Used   Substance and Sexual Activity    Alcohol use: No    Drug use: No    Sexual activity: Yes     Partners: Male   Lifestyle    Physical activity     Days per week: None     Minutes per session: None    Stress: None   Relationships    Social connections     Talks on phone: urinary urgency, no nocturia  Musculoskeletal: no joint swelling, no joint stiffness, no joint pain, no muscle cramps, no muscle pain  Integument/Breast: no hair loss, but no skin rashes, no skin lesions, no itching, no dry skin  Neurological: no numbness, no tingling, no weakness, no confusion, no headaches, no dizziness, no fainting, no tremors, no decrease in memory, no balance problems  Psychiatric: no anxiety, no depression, no insomnia  Hematologic/Lymphatic: no tendency for easy bleeding, no swollen lymph nodes, no tendency for easy bruising  Immunology: no seasonal allergies, no frequent infections, no frequent illnesses  Endocrine: no temperature intolerance    OBJECTIVE:   /60   Pulse 57   Temp 97.6 °F (36.4 °C)   Resp 14   Ht 5' 9\" (1.753 m)   Wt 159 lb (72.1 kg)   LMP 06/13/2020   SpO2 98%   BMI 23.48 kg/m²   Wt Readings from Last 3 Encounters:   09/24/20 159 lb (72.1 kg)   01/10/20 147 lb 3.2 oz (66.8 kg)   09/30/19 150 lb (68 kg)       Physical Exam:  Constitutional: no acute distress, well appearing, well nourished  Psychiatric: oriented to person, place and time, judgement, insight and normal, recent and remote memory and intact and mood, affect are normal  Skin: skin and subcutaneous tissue is normal without mass, normal turgor  Head and Face: examination of head and face revealed no abnormalities  Eyes: no lid or conjunctival swelling, no erythema or discharge, pupils are normal, equal, round, and reactive to light  Ears/Nose: external inspection of ears and nose revealed no abnormalities, hearing is grossly normal  Oropharynx/Mouth/Face: lips, tongue and gums are normal with no lesions, the voice quality was normal  Neck: neck is supple and symmetric, with midline trachea and no masses, thyroid is normal  Lymphatics: normal cervical lymph nodes, normal supraclavicular nodes  Pulmonary: no increased work of breathing or signs of respiratory distress, lungs are clear to

## 2020-10-15 RX ORDER — LEVOTHYROXINE SODIUM 0.2 MG/1
TABLET ORAL
Qty: 30 TABLET | Refills: 3 | Status: SHIPPED | OUTPATIENT
Start: 2020-10-15 | End: 2020-10-30 | Stop reason: SDUPTHER

## 2020-10-15 NOTE — TELEPHONE ENCOUNTER
PT lvm stating that she told Dr. Miguel Monk at her appt that she didn't need a refill but she does.  She needs a refill for her Levothyroxine 200 mcg  Sent to [facility]

## 2020-10-16 NOTE — TELEPHONE ENCOUNTER
Pt states she called her pharmacy (yesterday) and they have not rec'd her refill request for Levothyroxine @ [facility].  She tried calling pharmacy today but could not get through to them

## 2020-10-16 NOTE — TELEPHONE ENCOUNTER
Called pharmacy and confirmed medication is there. Called pt and informed pharmacy has medication ready.

## 2020-10-27 LAB — T3 FREE: 2.9 PG/ML (ref 2.3–4.2)

## 2020-10-28 LAB
ALBUMIN/GLOBULIN RATIO: 2.1 RATIO (ref 0.8–2.6)
ALBUMIN: 4.2 G/DL (ref 3.5–5.2)
ALP BLD-CCNC: 33 U/L (ref 23–144)
ALT SERPL-CCNC: 18 U/L (ref 0–60)
AST SERPL-CCNC: 17 U/L (ref 0–55)
BILIRUB SERPL-MCNC: 0.3 MG/DL (ref 0–1.2)
BUN BLDV-MCNC: 9 MG/DL (ref 3–29)
BUN/CREAT BLD: 18 (ref 7–25)
CALCIUM SERPL-MCNC: 9 MG/DL (ref 8.5–10.5)
CHLORIDE BLD-SCNC: 103 MEQ/L (ref 96–110)
CO2: 22 MEQ/L (ref 19–32)
CREAT SERPL-MCNC: 0.5 MG/DL (ref 0.5–1.2)
GFR AFRICAN AMERICAN: 144 MLS/MIN/1.73M2
GFR NON-AFRICAN AMERICAN: 124 MLS/MIN/1.73M2
GLOBULIN: 2 G/DL (ref 1.9–3.6)
GLUCOSE BLD-MCNC: 78 MG/DL (ref 70–99)
POTASSIUM SERPL-SCNC: 3.9 MEQ/L (ref 3.4–5.3)
SODIUM BLD-SCNC: 135 MEQ/L (ref 135–148)
STATUS: NORMAL
T4 FREE: 1.66 NG/DL (ref 0.8–1.8)
TOTAL PROTEIN: 6.2 G/DL (ref 6–8.3)
TSH SERPL DL<=0.05 MIU/L-ACNC: 1.05 MCIU/ML (ref 0.4–4.5)

## 2020-10-30 ENCOUNTER — OFFICE VISIT (OUTPATIENT)
Dept: ENDOCRINOLOGY | Age: 35
End: 2020-10-30
Payer: COMMERCIAL

## 2020-10-30 VITALS
RESPIRATION RATE: 14 BRPM | WEIGHT: 169 LBS | BODY MASS INDEX: 25.03 KG/M2 | DIASTOLIC BLOOD PRESSURE: 62 MMHG | HEART RATE: 81 BPM | TEMPERATURE: 97.2 F | HEIGHT: 69 IN | OXYGEN SATURATION: 98 % | SYSTOLIC BLOOD PRESSURE: 128 MMHG

## 2020-10-30 PROBLEM — Z34.90 PREGNANCY: Status: ACTIVE | Noted: 2020-10-30

## 2020-10-30 PROCEDURE — G8420 CALC BMI NORM PARAMETERS: HCPCS | Performed by: INTERNAL MEDICINE

## 2020-10-30 PROCEDURE — 1036F TOBACCO NON-USER: CPT | Performed by: INTERNAL MEDICINE

## 2020-10-30 PROCEDURE — 99213 OFFICE O/P EST LOW 20 MIN: CPT | Performed by: INTERNAL MEDICINE

## 2020-10-30 PROCEDURE — G8484 FLU IMMUNIZE NO ADMIN: HCPCS | Performed by: INTERNAL MEDICINE

## 2020-10-30 PROCEDURE — G8427 DOCREV CUR MEDS BY ELIG CLIN: HCPCS | Performed by: INTERNAL MEDICINE

## 2020-10-30 RX ORDER — LEVOTHYROXINE SODIUM 0.2 MG/1
TABLET ORAL
Qty: 30 TABLET | Refills: 3 | Status: SHIPPED | OUTPATIENT
Start: 2020-10-30 | End: 2020-12-15 | Stop reason: SDUPTHER

## 2020-12-15 ENCOUNTER — VIRTUAL VISIT (OUTPATIENT)
Dept: ENDOCRINOLOGY | Age: 35
End: 2020-12-15
Payer: COMMERCIAL

## 2020-12-15 PROCEDURE — 1036F TOBACCO NON-USER: CPT | Performed by: INTERNAL MEDICINE

## 2020-12-15 PROCEDURE — G8420 CALC BMI NORM PARAMETERS: HCPCS | Performed by: INTERNAL MEDICINE

## 2020-12-15 PROCEDURE — 99213 OFFICE O/P EST LOW 20 MIN: CPT | Performed by: INTERNAL MEDICINE

## 2020-12-15 PROCEDURE — G8427 DOCREV CUR MEDS BY ELIG CLIN: HCPCS | Performed by: INTERNAL MEDICINE

## 2020-12-15 PROCEDURE — G8484 FLU IMMUNIZE NO ADMIN: HCPCS | Performed by: INTERNAL MEDICINE

## 2020-12-15 RX ORDER — LEVOTHYROXINE SODIUM 0.2 MG/1
TABLET ORAL
Qty: 30 TABLET | Refills: 3 | Status: SHIPPED
Start: 2020-12-15 | End: 2021-05-10 | Stop reason: DRUGHIGH

## 2020-12-15 NOTE — PROGRESS NOTES
 Social connections     Talks on phone: None     Gets together: None     Attends Sikh service: None     Active member of club or organization: None     Attends meetings of clubs or organizations: None     Relationship status: None    Intimate partner violence     Fear of current or ex partner: None     Emotionally abused: None     Physically abused: None     Forced sexual activity: None   Other Topics Concern    None   Social History Narrative    None     Current Outpatient Medications   Medication Sig Dispense Refill    levothyroxine (SYNTHROID) 200 MCG tablet 1 tablet daily 30 tablet 3    cyanocobalamin ([facility] VITAMIN B12) 1000 MCG tablet Take by mouth      Prenatal Multivit-Min-Fe-FA (PRENATAL 1 + IRON PO) Take by mouth      Cholecalciferol (VITAMIN D3) 125 MCG (5000 UT) TABS Take by mouth 3-4 times a week      Pyridoxine HCl  MG TBCR Take by mouth       No current facility-administered medications for this visit.       No Known Allergies  Family Status   Relation Name Status    Mother  Alive    Father  Alive    Brother  Alive    Son  Alive       Review of Systems:  Constitutional: has fatigue, no fever, no recent weight gain, no recent weight loss, no changes in appetite  Eyes: no eye pain, no change in vision, no eye redness, no eye irritation, no double vision  Ears, nose, throat: no nasal congestion, no sore throat, no earache, no decrease in hearing, no hoarseness, no dry mouth, no sinus problems, no difficulty swallowing, no neck lumps, no dental problems, no mouth sores, no ringing in ears  Pulmonary: no shortness of breath, no wheezing, no dyspnea on exertion, no cough  Cardiovascular: no chest pain, no lower extremity edema, no orthopnea, no intermittent leg claudication, no palpitations  Gastrointestinal: no abdominal pain, no nausea, no vomiting, no diarrhea, no constipation, no heartburn, no bloating Genitourinary: no dysuria, no urinary incontinence, no urinary hesitancy, no change in urinary frequency, no feelings of urinary urgency, no nocturia  Musculoskeletal: no joint swelling, no joint stiffness, no joint pain, no muscle cramps, no muscle pain  Integument/Breast: no hair loss, but no skin rashes, no skin lesions, no itching, no dry skin  Neurological: no numbness, no tingling, no weakness, no confusion, no headaches, no dizziness, no fainting, no tremors, no decrease in memory, no balance problems  Psychiatric: no anxiety, no depression, no insomnia  Hematologic/Lymphatic: no tendency for easy bleeding, no swollen lymph nodes, no tendency for easy bruising  Immunology: no seasonal allergies, no frequent infections, no frequent illnesses  Endocrine: no temperature intolerance    OBJECTIVE:     Wt Readings from Last 3 Encounters:   10/30/20 169 lb (76.7 kg)   09/24/20 159 lb (72.1 kg)   01/10/20 147 lb 3.2 oz (66.8 kg)       Physical Exam:  Constitutional: no acute distress, well appearing, well nourished  Psychiatric: oriented to person, place and time, judgement, insight and normal, recent and remote memory and intact and mood, affect are normal  Skin: skin and subcutaneous tissue is normal without mass, normal turgor  Head and Face: examination of head and face revealed no abnormalities  Eyes: no lid or conjunctival swelling, no erythema or discharge, pupils are normal, equal, round, and reactive to light  Ears/Nose: external inspection of ears and nose revealed no abnormalities, hearing is grossly normal  Oropharynx/Mouth/Face: lips, tongue and gums are normal with no lesions, the voice quality was normal  Neck: neck is supple and symmetric, with midline trachea and no masses, thyroid is normal  Lymphatics: normal cervical lymph nodes, normal supraclavicular nodes  Pulmonary: no increased work of breathing or signs of respiratory distress, lungs are clear to auscultation Cardiovascular: normal heart rate and rhythm, normal S1 and S2, no murmurs and pedal pulses and 2+ bilaterally, No edema  Abdomen: abdomen is soft, non-tender with no masses  Musculoskeletal: normal gait and station, exam of the digits and nails are normal  Neurological: normal coordination, normal general cortical function    Lab Review:  Lab Results   Component Value Date    TSH 0.391 09/22/2020     No results found for: FREET4     ASSESSMENT/PLAN:    1. Acquired hypothyroidism  TSH 0.75-7.4-0.1250.0682.890.391-1.05-0.737  Levothyroxine 0.200 mg daily  - T4, Free; Future  - TSH without Reflex; Future  Gluten free diet     2. IFG  Glucose 595-6199650  Diet, exercise. HbA1C 5.35.45.2    3. Pregnancy  27 weeks pregnant  Due 3/20/2021      Reviewed and/or ordered clinical lab results Yes  Reviewed and/or ordered radiology tests No   Reviewed and/or ordered other diagnostic tests No  Discussed test results with performing physician No  Independently reviewed image, tracing, or specimen No  Made a decision to obtain old records No  Reviewed and summarized old records No  Obtained history from other than patient No    Kiarra Boss was counseled regarding symptoms of hypothyroidism, hypothyroidism and pregnancy diagnosis, course and complications of disease if inadequately treated, side effects of medications, diagnosis, treatment options, labs, imaging and other studies and treatment targets and goals, TSH target, diet, activity, importance of hypothyroidism management in pregnancy, complications of uncontrolled hypothyroidism  She understands instructions and counseling. Total time 15 min, more than 50% was counseling time. Return in about 5 weeks (around 1/19/2021) for thyroid problems.

## 2021-01-15 ENCOUNTER — TELEPHONE (OUTPATIENT)
Dept: ENDOCRINOLOGY | Age: 36
End: 2021-01-15

## 2021-01-15 ENCOUNTER — VIRTUAL VISIT (OUTPATIENT)
Dept: ENDOCRINOLOGY | Age: 36
End: 2021-01-15
Payer: COMMERCIAL

## 2021-01-15 DIAGNOSIS — R73.01 IFG (IMPAIRED FASTING GLUCOSE): ICD-10-CM

## 2021-01-15 DIAGNOSIS — E03.9 ACQUIRED HYPOTHYROIDISM: Primary | ICD-10-CM

## 2021-01-15 DIAGNOSIS — Z34.90 PREGNANCY, UNSPECIFIED GESTATIONAL AGE: ICD-10-CM

## 2021-01-15 PROCEDURE — G8427 DOCREV CUR MEDS BY ELIG CLIN: HCPCS | Performed by: INTERNAL MEDICINE

## 2021-01-15 PROCEDURE — 99212 OFFICE O/P EST SF 10 MIN: CPT | Performed by: INTERNAL MEDICINE

## 2021-01-15 RX ORDER — LANOLIN ALCOHOL/MO/W.PET/CERES
325 CREAM (GRAM) TOPICAL
COMMUNITY
End: 2021-09-13 | Stop reason: ALTCHOICE

## 2021-01-15 RX ORDER — FOLIC ACID 1 MG/1
1 TABLET ORAL DAILY
COMMUNITY
End: 2021-09-13 | Stop reason: ALTCHOICE

## 2021-01-15 NOTE — TELEPHONE ENCOUNTER
[facility].   On 1/14/2021

## 2021-01-15 NOTE — PROGRESS NOTES
SUBJECTIVE:  Robert Garza is a 43 y.o. female who is here for hypothyroidism. 1/15/2021    TELEHEALTH EVALUATION -- Audio/Visual (During NXBMR-61 public health emergency)    Patient provided verbal consent to use the video visit. HPI:    Robert Garza (:  1985) has requested an audio/video evaluation for the following concern(s):      1. Acquired hypothyroidism     This started in . Patient was diagnosed with hypothyroidism. The problem has been unchanged. Patient started medication in . Currently patient is on: levothyroxine. Misses  0 doses a month.     Current complaints: fatigue  Takes iron    History of obstructive symptoms: difficulty swallowing No, changes in voice/hoarseness No.     History of radiation to patient's neck: No  Resent iodine exposure: No  Family history includes hypothyroidism. Family history of thyroid cancer: No     Has 8 yo son.     2. Pregnancy  31 weeks pregnant  Due 3/20/2021    3.  IFG  Glucose [id number]86    Past Medical History:   Diagnosis Date    Hypothyroidism      Patient Active Problem List    Diagnosis Date Noted    Pregnancy 10/30/2020    IFG (impaired fasting glucose) 2019    Impaired intestinal absorption 10/31/2018    Acquired hypothyroidism 2018     Past Surgical History:   Procedure Laterality Date    FERTILITY SURGERY       Family History   Problem Relation Age of Onset    Thyroid Disease Mother     No Known Problems Father     No Known Problems Brother     No Known Problems Son      Social History     Socioeconomic History    Marital status:      Spouse name: None    Number of children: None    Years of education: None    Highest education level: None   Occupational History    None   Social Needs    Financial resource strain: None    Food insecurity     Worry: None     Inability: None    Transportation needs     Medical: None     Non-medical: None   Tobacco Use    Smoking status: Never Smoker  Smokeless tobacco: Never Used   Substance and Sexual Activity    Alcohol use: No    Drug use: No    Sexual activity: Yes     Partners: Male   Lifestyle    Physical activity     Days per week: None     Minutes per session: None    Stress: None   Relationships    Social connections     Talks on phone: None     Gets together: None     Attends Mosque service: None     Active member of club or organization: None     Attends meetings of clubs or organizations: None     Relationship status: None    Intimate partner violence     Fear of current or ex partner: None     Emotionally abused: None     Physically abused: None     Forced sexual activity: None   Other Topics Concern    None   Social History Narrative    None     Current Outpatient Medications   Medication Sig Dispense Refill    folic acid (FOLVITE) 1 MG tablet Take 1 mg by mouth daily      ferrous sulfate (FE TABS 325) 325 (65 Fe) MG EC tablet Take 325 mg by mouth 3 times daily (with meals)      levothyroxine (SYNTHROID) 200 MCG tablet 1 tablet daily 30 tablet 3    cyanocobalamin ([employer] VITAMIN B12) 1000 MCG tablet Take by mouth      Prenatal Multivit-Min-Fe-FA (PRENATAL 1 + IRON PO) Take by mouth      Cholecalciferol (VITAMIN D3) 125 MCG (5000 UT) TABS Take by mouth 3-4 times a week      Pyridoxine HCl  MG TBCR Take by mouth       No current facility-administered medications for this visit.       No Known Allergies  Family Status   Relation Name Status    Mother  Alive    Father  Alive    Brother  Alive    Son  Alive       Review of Systems:  Constitutional: has fatigue, no fever, no recent weight gain, no recent weight loss, no changes in appetite  Eyes: no eye pain, no change in vision, no eye redness, no eye irritation, no double vision Ears, nose, throat: no nasal congestion, no sore throat, no earache, no decrease in hearing, no hoarseness, no dry mouth, no sinus problems, no difficulty swallowing, no neck lumps, no dental problems, no mouth sores, no ringing in ears  Pulmonary: no shortness of breath, no wheezing, no dyspnea on exertion, no cough  Cardiovascular: no chest pain, no lower extremity edema, no orthopnea, no intermittent leg claudication, no palpitations  Gastrointestinal: no abdominal pain, no nausea, no vomiting, no diarrhea, no constipation, no heartburn, no bloating  Genitourinary: no dysuria, no urinary incontinence, no urinary hesitancy, no change in urinary frequency, no feelings of urinary urgency, no nocturia  Musculoskeletal: no joint swelling, no joint stiffness, no joint pain, no muscle cramps, no muscle pain  Integument/Breast: no hair loss, but no skin rashes, no skin lesions, no itching, no dry skin  Neurological: no numbness, no tingling, no weakness, no confusion, no headaches, no dizziness, no fainting, no tremors, no decrease in memory, no balance problems  Psychiatric: no anxiety, no depression, no insomnia  Hematologic/Lymphatic: no tendency for easy bleeding, no swollen lymph nodes, no tendency for easy bruising  Immunology: no seasonal allergies, no frequent infections, no frequent illnesses  Endocrine: no temperature intolerance    OBJECTIVE:     Wt Readings from Last 3 Encounters:   10/30/20 169 lb (76.7 kg)   09/24/20 159 lb (72.1 kg)   01/10/20 147 lb 3.2 oz (66.8 kg)       OBJECTIVE:  Constitutional: no apparent distress, well developed and well nourished  Mental status: alert and awake, oriented to person, place and time, able to follow commands  Psychiatric: judgement and insight and normal, recent and remote memory are intact, mood and affect are normal  Skin: skin inspection appears normal, no significant exanthematous lesions or discoloration noted on facial skin Head and Face: head and face inspection revealed no abnormalities, normocephalic, atraumatic  Eyes: no lid or conjunctival swelling, erythema or discharge, sclera appears normal  Ears/Nose: external inspection of ears and nose revealed no abnormalities, hearing is grossly normal  Oropharynx/Mouth/Face: lips are normal with no lesions, the voice quality was normal  Neck: neck is symmetric, no visualized mass  Pulmonary/chest: respiratory effort normal, no generalized signs of difficulty breathing or signs of respiratory distress  Musculoskeletal: normal station, normal range of motion of neck  Neurological: no facial asymmetry, normal general cortical function      Lab Review:  Lab Results   Component Value Date    TSH 0.391 09/22/2020     No results found for: FREET4     ASSESSMENT/PLAN:    1. Acquired hypothyroidism  TSH 0.75-7.4-0.1250.0682.890.391-1.05-0.737-0.639  Levothyroxine 0.200 mg daily  Prepregnancy dose  - T4, Free; Future  - TSH without Reflex; Future  Gluten free diet     2. IFG  Glucose 771-2864899  Diet, exercise. HbA1C 5.35.45.2    3.  Pregnancy  31 weeks pregnant  Due 3/20/2021      Reviewed and/or ordered clinical lab results Yes  Reviewed and/or ordered radiology tests No   Reviewed and/or ordered other diagnostic tests No  Discussed test results with performing physician No  Independently reviewed image, tracing, or specimen No  Made a decision to obtain old records No  Reviewed and summarized old records No  Obtained history from other than patient No    Carli Gerber was counseled regarding symptoms of hypothyroidism, hypothyroidism and pregnancy diagnosis, course and complications of disease if inadequately treated, side effects of medications, diagnosis, treatment options, labs, imaging and other studies and treatment targets and goals, TSH target, diet, activity, importance of hypothyroidism management in pregnancy, complications of uncontrolled hypothyroidism Return in about 5 weeks (around 2/19/2021) for thyroid problems.

## 2021-02-17 LAB
T3 FREE: 3 PG/ML (ref 2.3–4.2)
T4 FREE: 1.38 NG/DL (ref 0.8–1.8)
TSH SERPL DL<=0.05 MIU/L-ACNC: 0.56 MCIU/ML (ref 0.4–4.5)

## 2021-02-19 ENCOUNTER — VIRTUAL VISIT (OUTPATIENT)
Dept: ENDOCRINOLOGY | Age: 36
End: 2021-02-19
Payer: COMMERCIAL

## 2021-02-19 DIAGNOSIS — E03.9 ACQUIRED HYPOTHYROIDISM: Primary | ICD-10-CM

## 2021-02-19 DIAGNOSIS — Z34.90 PREGNANCY, UNSPECIFIED GESTATIONAL AGE: ICD-10-CM

## 2021-02-19 DIAGNOSIS — R73.01 IFG (IMPAIRED FASTING GLUCOSE): ICD-10-CM

## 2021-02-19 PROCEDURE — G8427 DOCREV CUR MEDS BY ELIG CLIN: HCPCS | Performed by: INTERNAL MEDICINE

## 2021-02-19 PROCEDURE — 99213 OFFICE O/P EST LOW 20 MIN: CPT | Performed by: INTERNAL MEDICINE

## 2021-02-19 RX ORDER — LEVOTHYROXINE SODIUM 175 UG/1
175 TABLET ORAL DAILY
Qty: 30 TABLET | Refills: 3 | Status: SHIPPED | OUTPATIENT
Start: 2021-02-19 | End: 2021-05-10

## 2021-02-19 NOTE — PROGRESS NOTES
SUBJECTIVE:  Rubi Joseph is a 43 y.o. female who is here for hypothyroidism. 2021    TELEHEALTH EVALUATION -- Audio/Visual (During YLEAY-66 public health emergency)    Patient provided verbal consent to use the video visit. HPI:    Rubi Joseph (:  1985) has requested an audio/video evaluation for the following concern(s):      1. Acquired hypothyroidism     This started in . Patient was diagnosed with hypothyroidism. The problem has been unchanged. Patient started medication in . Currently patient is on: levothyroxine. Misses  0 doses a month.     Current complaints: fatigue  Takes iron    History of obstructive symptoms: difficulty swallowing No, changes in voice/hoarseness No.     History of radiation to patient's neck: No  Resent iodine exposure: No  Family history includes hypothyroidism. Family history of thyroid cancer: No     Has 10 yo son.     2. Pregnancy  36 weeks pregnant  Due 3/13/2021    3.  IFG  Glucose 396-8947556    Past Medical History:   Diagnosis Date    Hypothyroidism      Patient Active Problem List    Diagnosis Date Noted    Pregnancy 10/30/2020    IFG (impaired fasting glucose) 2019    Impaired intestinal absorption 10/31/2018    Acquired hypothyroidism 2018     Past Surgical History:   Procedure Laterality Date    FERTILITY SURGERY       Family History   Problem Relation Age of Onset    Thyroid Disease Mother     No Known Problems Father     No Known Problems Brother     No Known Problems Son      Social History     Socioeconomic History    Marital status:      Spouse name: None    Number of children: None    Years of education: None    Highest education level: None   Occupational History    None   Social Needs    Financial resource strain: None    Food insecurity     Worry: None     Inability: None    Transportation needs     Medical: None     Non-medical: None   Tobacco Use    Smoking status: Never Smoker    Smokeless tobacco: Never Used   Substance and Sexual Activity    Alcohol use: No    Drug use: No    Sexual activity: Yes     Partners: Male   Lifestyle    Physical activity     Days per week: None     Minutes per session: None    Stress: None   Relationships    Social connections     Talks on phone: None     Gets together: None     Attends [employer] service: None     Active member of club or organization: None     Attends meetings of clubs or organizations: None     Relationship status: None    Intimate partner violence     Fear of current or ex partner: None     Emotionally abused: None     Physically abused: None     Forced sexual activity: None   Other Topics Concern    None   Social History Narrative    None     Current Outpatient Medications   Medication Sig Dispense Refill    levothyroxine (SYNTHROID) 175 MCG tablet Take 1 tablet by mouth daily 30 tablet 3    folic acid (FOLVITE) 1 MG tablet Take 1 mg by mouth daily      ferrous sulfate (FE TABS 325) 325 (65 Fe) MG EC tablet Take 325 mg by mouth 3 times daily (with meals)      levothyroxine (SYNTHROID) 200 MCG tablet 1 tablet daily 30 tablet 3    cyanocobalamin ([employer]S VITAMIN B12) 1000 MCG tablet Take by mouth      Prenatal Multivit-Min-Fe-FA (PRENATAL 1 + IRON PO) Take by mouth      Cholecalciferol (VITAMIN D3) 125 MCG (5000 UT) TABS Take by mouth 3-4 times a week      Pyridoxine HCl  MG TBCR Take by mouth       No current facility-administered medications for this visit.       No Known Allergies  Family Status   Relation Name Status    Mother  Alive    Father  Alive    Brother  Alive    Son  Alive       Review of Systems:  Constitutional: has fatigue, no fever, no recent weight gain, no recent weight loss, no changes in appetite  Eyes: no eye pain, no change in vision, no eye redness, no eye irritation, no double vision  Ears, nose, throat: no nasal congestion, no sore throat, no earache, no decrease in hearing, no hoarseness, no dry mouth, no sinus problems, no difficulty swallowing, no neck lumps, no dental problems, no mouth sores, no ringing in ears  Pulmonary: no shortness of breath, no wheezing, no dyspnea on exertion, no cough  Cardiovascular: no chest pain, no lower extremity edema, no orthopnea, no intermittent leg claudication, no palpitations  Gastrointestinal: no abdominal pain, no nausea, no vomiting, no diarrhea, no constipation, no heartburn, no bloating  Genitourinary: no dysuria, no urinary incontinence, no urinary hesitancy, no change in urinary frequency, no feelings of urinary urgency, no nocturia  Musculoskeletal: no joint swelling, no joint stiffness, no joint pain, no muscle cramps, no muscle pain  Integument/Breast: no hair loss, but no skin rashes, no skin lesions, no itching, no dry skin  Neurological: no numbness, no tingling, no weakness, no confusion, no headaches, no dizziness, no fainting, no tremors, no decrease in memory, no balance problems  Psychiatric: no anxiety, no depression, no insomnia  Hematologic/Lymphatic: no tendency for easy bleeding, no swollen lymph nodes, no tendency for easy bruising  Immunology: no seasonal allergies, no frequent infections, no frequent illnesses  Endocrine: no temperature intolerance    OBJECTIVE:     Wt Readings from Last 3 Encounters:   10/30/20 169 lb (76.7 kg)   09/24/20 159 lb (72.1 kg)   01/10/20 147 lb 3.2 oz (66.8 kg)       OBJECTIVE:  Constitutional: no apparent distress, well developed and well nourished  Mental status: alert and awake, oriented to person, place and time, able to follow commands  Psychiatric: judgement and insight and normal, recent and remote memory are intact, mood and affect are normal  Skin: skin inspection appears normal, no significant exanthematous lesions or discoloration noted on facial skin  Head and Face: head and face inspection revealed no abnormalities, normocephalic, atraumatic  Eyes: no lid or conjunctival swelling, erythema or discharge, sclera appears normal  Ears/Nose: external inspection of ears and nose revealed no abnormalities, hearing is grossly normal  Oropharynx/Mouth/Face: lips are normal with no lesions, the voice quality was normal  Neck: neck is symmetric, no visualized mass  Pulmonary/chest: respiratory effort normal, no generalized signs of difficulty breathing or signs of respiratory distress  Musculoskeletal: normal station, normal range of motion of neck  Neurological: no facial asymmetry, normal general cortical function      Lab Review:  Lab Results   Component Value Date    TSH 0.562 02/17/2021     No results found for: FREET4     ASSESSMENT/PLAN:    1. Acquired hypothyroidism  TSH 0.75-7.4-0.1250.0682.890.391-1.05-0.737-0.639-0.56  Levothyroxine 0.200 mg daily  Prepregnancy dose 0.175 mg  Start prepregnancy dose 0.175 mg after delivery. - T4, Free; Future  - TSH without Reflex; Future  Gluten free diet     2. IFG  Glucose 261-3720194  Diet, exercise. HbA1C 5.35.45.2    3. Pregnancy  36 weeks pregnant  Due 3/13/2021    Reviewed and/or ordered clinical lab results Yes  Reviewed and/or ordered radiology tests No   Reviewed and/or ordered other diagnostic tests No  Discussed test results with performing physician No  Independently reviewed image, tracing, or specimen No  Made a decision to obtain old records No  Reviewed and summarized old records No  Obtained history from other than patient No    Armando Workman was counseled regarding symptoms of hypothyroidism, hypothyroidism and pregnancy diagnosis, course and complications of disease if inadequately treated, side effects of medications, diagnosis, treatment options, labs, imaging and other studies and treatment targets and goals, TSH target, diet, activity, importance of hypothyroidism management in pregnancy, complications of uncontrolled hypothyroidism  She understands instructions and counseling.     Armando Workman is a 43 y.o. female being evaluated by a Virtual Visit (video visit) encounter, including two-way audio and video communication, in lieu of an in-person visit due to coronavirus emergency, to address concerns as mentioned in history and assessment and plan. Patient identification was verified at the start of the visit. I conducted an interview, performed a limited exam by video and educated the patient on my assessment and plan. Due to this being a TeleHealth encounter (During [facility] public health emergency), evaluation of the following organ systems was limited: Vitals/Constitutional/EENT/Resp/CV/GI//MS/Neuro/Skin/Heme-Lymph-Imm. Pursuant to the emergency declaration under the [address] [place] [address], [address] [place] [address] authority and the [employer] Act, this Virtual Visit was conducted with patient's (and/or legal guardian's) consent, to reduce the patient's risk of exposure to COVID-19 and provide necessary medical care. The patient (and/or legal guardian) has also been advised to contact this office for worsening conditions or problems, and seek emergency medical treatment and/or call 911 if deemed necessary. Total time spent on this encounter via Telehealth (synchronous, real-time audio/visual connection): 20 min    See assessment, plan and counseling note for counseling and care coordination details. Services were provided through a video synchronous discussion virtually to substitute for in-person clinic visit. Persons participating in the telehealth service: provider - Fredrick Alan MD and patient Robret Smith. Provider was located at her office. Patient was located at home. --Fredrick Alan MD on 2/20/2021 at 1:22 AM    An electronic signature was used to authenticate this note. Return in about 3 months (around 5/10/2021) for thyroid problems.

## 2021-05-10 ENCOUNTER — VIRTUAL VISIT (OUTPATIENT)
Dept: ENDOCRINOLOGY | Age: 36
End: 2021-05-10
Payer: COMMERCIAL

## 2021-05-10 DIAGNOSIS — R73.01 IFG (IMPAIRED FASTING GLUCOSE): ICD-10-CM

## 2021-05-10 DIAGNOSIS — E03.9 ACQUIRED HYPOTHYROIDISM: Primary | ICD-10-CM

## 2021-05-10 PROCEDURE — G8427 DOCREV CUR MEDS BY ELIG CLIN: HCPCS | Performed by: INTERNAL MEDICINE

## 2021-05-10 PROCEDURE — 99213 OFFICE O/P EST LOW 20 MIN: CPT | Performed by: INTERNAL MEDICINE

## 2021-05-10 RX ORDER — LEVOTHYROXINE SODIUM 175 UG/1
175 TABLET ORAL DAILY
Qty: 30 TABLET | Refills: 3 | Status: SHIPPED | OUTPATIENT
Start: 2021-05-10 | End: 2021-09-13 | Stop reason: SDUPTHER

## 2021-05-10 NOTE — PROGRESS NOTES
SUBJECTIVE:  Bina Samson is a 43 y.o. female who is here for hypothyroidism. 5/10/2021    TELEHEALTH EVALUATION -- Audio/Visual (During VWFWS-37 public health emergency)    Patient provided verbal consent to use the video visit. HPI:    Bina Samson (:  1985) has requested an audio/video evaluation for the following concern(s):      1. Acquired hypothyroidism     This started in . Patient was diagnosed with hypothyroidism. The problem has been unchanged. Patient started medication in . Currently patient is on: levothyroxine. Misses  0 doses a month.     Current complaints: fatigue  Takes iron    History of obstructive symptoms: difficulty swallowing No, changes in voice/hoarseness No.     History of radiation to patient's neck: No  Resent iodine exposure: No  Family history includes hypothyroidism. Family history of thyroid cancer: No     Has 10 yo son. 3/3/2021 had a healthy baby boy    3.  IFG  Glucose 241-1796120    Past Medical History:   Diagnosis Date    Hypothyroidism      Patient Active Problem List    Diagnosis Date Noted    IFG (impaired fasting glucose) 2019    Impaired intestinal absorption 10/31/2018    Acquired hypothyroidism 2018     Past Surgical History:   Procedure Laterality Date    FERTILITY SURGERY       Family History   Problem Relation Age of Onset    Thyroid Disease Mother     No Known Problems Father     No Known Problems Brother     No Known Problems Son      Social History     Socioeconomic History    Marital status:      Spouse name: None    Number of children: None    Years of education: None    Highest education level: None   Occupational History    None   Social Needs    Financial resource strain: None    Food insecurity     Worry: None     Inability: None    Transportation needs     Medical: None     Non-medical: None   Tobacco Use    Smoking status: Never Smoker    Smokeless tobacco: Never Used   Substance and Sexual Activity    Alcohol use: No    Drug use: No    Sexual activity: Yes     Partners: Male   Lifestyle    Physical activity     Days per week: None     Minutes per session: None    Stress: None   Relationships    Social connections     Talks on phone: None     Gets together: None     Attends Rastafarian service: None     Active member of club or organization: None     Attends meetings of clubs or organizations: None     Relationship status: None    Intimate partner violence     Fear of current or ex partner: None     Emotionally abused: None     Physically abused: None     Forced sexual activity: None   Other Topics Concern    None   Social History Narrative    None     Current Outpatient Medications   Medication Sig Dispense Refill    levothyroxine (SYNTHROID) 175 MCG tablet Take 1 tablet by mouth daily 30 tablet 3    Prenatal Multivit-Min-Fe-FA (PRENATAL 1 + IRON PO) Take by mouth      folic acid (FOLVITE) 1 MG tablet Take 1 mg by mouth daily      ferrous sulfate (FE TABS 325) 325 (65 Fe) MG EC tablet Take 325 mg by mouth 3 times daily (with meals)      cyanocobalamin ([employer] VITAMIN B12) 1000 MCG tablet Take by mouth      Pyridoxine HCl  MG TBCR Take by mouth      Cholecalciferol (VITAMIN D3) 125 MCG (5000 UT) TABS Take by mouth 3-4 times a week       No current facility-administered medications for this visit.       No Known Allergies  Family Status   Relation Name Status    Mother  Alive    Father  Alive    Brother  Alive    Son  Alive       Review of Systems:  Constitutional: has fatigue, no fever, no recent weight gain, no recent weight loss, no changes in appetite  Eyes: no eye pain, no change in vision, no eye redness, no eye irritation, no double vision  Ears, nose, throat: no nasal congestion, no sore throat, no earache, no decrease in hearing, no hoarseness, no dry mouth, no sinus problems, no difficulty swallowing, no neck lumps, no dental problems, no mouth sores, no ringing in ears  Pulmonary: no shortness of breath, no wheezing, no dyspnea on exertion, no cough  Cardiovascular: no chest pain, no lower extremity edema, no orthopnea, no intermittent leg claudication, no palpitations  Gastrointestinal: no abdominal pain, no nausea, no vomiting, no diarrhea, no constipation, no heartburn, no bloating  Genitourinary: no dysuria, no urinary incontinence, no urinary hesitancy, no change in urinary frequency, no feelings of urinary urgency, no nocturia  Musculoskeletal: no joint swelling, no joint stiffness, no joint pain, no muscle cramps, no muscle pain  Integument/Breast: no hair loss, but no skin rashes, no skin lesions, no itching, no dry skin  Neurological: no numbness, no tingling, no weakness, no confusion, no headaches, no dizziness, no fainting, no tremors, no decrease in memory, no balance problems  Psychiatric: no anxiety, no depression, no insomnia  Hematologic/Lymphatic: no tendency for easy bleeding, no swollen lymph nodes, no tendency for easy bruising  Immunology: no seasonal allergies, no frequent infections, no frequent illnesses  Endocrine: no temperature intolerance    OBJECTIVE:     Wt Readings from Last 3 Encounters:   10/30/20 169 lb (76.7 kg)   09/24/20 159 lb (72.1 kg)   01/10/20 147 lb 3.2 oz (66.8 kg)       OBJECTIVE:  Constitutional: no apparent distress, well developed and well nourished  Mental status: alert and awake, oriented to person, place and time, able to follow commands  Psychiatric: judgement and insight and normal, recent and remote memory are intact, mood and affect are normal  Skin: skin inspection appears normal, no significant exanthematous lesions or discoloration noted on facial skin  Head and Face: head and face inspection revealed no abnormalities, normocephalic, atraumatic  Eyes: no lid or conjunctival swelling, erythema or discharge, sclera appears normal  Ears/Nose: external inspection of ears and nose revealed no abnormalities, hearing is grossly normal  Oropharynx/Mouth/Face: lips are normal with no lesions, the voice quality was normal  Neck: neck is symmetric, no visualized mass  Pulmonary/chest: respiratory effort normal, no generalized signs of difficulty breathing or signs of respiratory distress  Musculoskeletal: normal station, normal range of motion of neck  Neurological: no facial asymmetry, normal general cortical function      Lab Review:  Lab Results   Component Value Date    TSH 0.320 05/07/2021     No results found for: FREET4     ASSESSMENT/PLAN:    1. Acquired hypothyroidism  TSH 0.75-7.4-0.1250.0682.890.391-1.05-0.737-0.639-0.56-0.32  Started BCP this week  Advised to continue levothyroxine 0.175 mg daily. May have higher requirement for levothyroxine when on birth control pills, last TSH was 0.32. No palpitations, insomnia, anxiety    - T4, Free; Future  - TSH without Reflex; Future  Gluten free diet     2. IFG  Glucose 133-8293346  Diet, exercise. HbA1C 5.35.45.2    3/3/2021 had a healthy baby boy    Reviewed and/or ordered clinical lab results Yes  Reviewed and/or ordered radiology tests No   Reviewed and/or ordered other diagnostic tests No  Discussed test results with performing physician No  Independently reviewed image, tracing, or specimen No  Made a decision to obtain old records No  Reviewed and summarized old records No  Obtained history from other than patient No    Benedict Kelly was counseled regarding symptoms of hypothyroidism, hypothyroidism and pregnancy diagnosis, course and complications of disease if inadequately treated, side effects of medications, diagnosis, treatment options, labs, imaging and other studies and treatment targets and goals, TSH target, diet, activity, importance of hypothyroidism management in pregnancy, complications of uncontrolled hypothyroidism  She understands instructions and counseling.     Benedict Kelly is a 43 y.o. female being evaluated by a Virtual Visit (video visit) encounter, including two-way audio and video communication, in lieu of an in-person visit due to coronavirus emergency, to address concerns as mentioned in history and assessment and plan. Patient identification was verified at the start of the visit. I conducted an interview, performed a limited exam by video and educated the patient on my assessment and plan. Due to this being a TeleHealth encounter (During YAQES-93 public health emergency), evaluation of the following organ systems was limited: Vitals/Constitutional/EENT/Resp/CV/GI//MS/Neuro/Skin/Heme-Lymph-Imm. Pursuant to the emergency declaration under the [address] [place] [address], [address] [place] [address] authority and the [employer] Act, this Virtual Visit was conducted with patient's (and/or legal guardian's) consent, to reduce the patient's risk of exposure to COVID-19 and provide necessary medical care. The patient (and/or legal guardian) has also been advised to contact this office for worsening conditions or problems, and seek emergency medical treatment and/or call 911 if deemed necessary. Total time spent on this encounter via Telehealth (synchronous, real-time audio/visual connection): 20 min    See assessment, plan and counseling note for counseling and care coordination details. Services were provided through a video synchronous discussion virtually to substitute for in-person clinic visit. Persons participating in the telehealth service: provider - Jose Engel MD and patient Brooklyn Orozco. Provider was located at her office. Patient was located at home. --Jose Engel MD on 5/10/2021 at 11:22 AM    An electronic signature was used to authenticate this note. Return in about 3 months (around 8/10/2021) for thyroid problems.

## 2021-07-23 ENCOUNTER — TELEPHONE (OUTPATIENT)
Dept: ENDOCRINOLOGY | Age: 36
End: 2021-07-23

## 2021-09-13 ENCOUNTER — VIRTUAL VISIT (OUTPATIENT)
Dept: ENDOCRINOLOGY | Age: 36
End: 2021-09-13
Payer: COMMERCIAL

## 2021-09-13 DIAGNOSIS — E03.9 ACQUIRED HYPOTHYROIDISM: Primary | ICD-10-CM

## 2021-09-13 DIAGNOSIS — R73.01 IFG (IMPAIRED FASTING GLUCOSE): ICD-10-CM

## 2021-09-13 PROCEDURE — 99213 OFFICE O/P EST LOW 20 MIN: CPT | Performed by: INTERNAL MEDICINE

## 2021-09-13 PROCEDURE — G8427 DOCREV CUR MEDS BY ELIG CLIN: HCPCS | Performed by: INTERNAL MEDICINE

## 2021-09-13 RX ORDER — LEVOTHYROXINE SODIUM 175 UG/1
TABLET ORAL
Qty: 30 TABLET | Refills: 3 | Status: SHIPPED | OUTPATIENT
Start: 2021-09-13 | End: 2022-01-11 | Stop reason: SDUPTHER

## 2021-09-13 RX ORDER — LEVONORGESTREL AND ETHINYL ESTRADIOL 0.1-0.02MG
1 KIT ORAL DAILY
COMMUNITY
Start: 2021-05-05

## 2021-09-13 NOTE — PROGRESS NOTES
02/10/2020  Mariann Huff is a 56 y.o., female.      Pre-op Assessment         Review of Systems  Anesthesia Hx:  No problems with previous Anesthesia  History of prior surgery of interest to airway management or planning: tracheostomy, heart surgery. Previous anesthesia: General cystogram 12/11/19 with general anesthesia.    Social:  No Alcohol Use, Non-Smoker    Hematology/Oncology:     Oncology Normal   Hematology Comments: On plavix & asa   EENT/Dental:   chronic allergic rhinitis   Cardiovascular:   Exercise tolerance: poor Hypertension Past MI CAD  CABG/stent  CHF hyperlipidemia    Pulmonary:   Asthma Sleep Apnea Doesn't use c-pap  Rent bronchitis   Renal/:   Chronic Renal Disease Hydronephrosis  Ureter damaged from a previous back surgery   Hepatic/GI:   PUD, Liver Disease, Hx fatty liver   Musculoskeletal:   Arthritis  Hx lumbar radiculopathy   Neurological:   Neuromuscular Disease,   Chronic Pain Syndrome   Endocrine:   Diabetes, type 2 Blood sugar     Last A1c 9.2 in december   Dermatological:  Skin Normal    Psych:   depression             Anesthesia Assessment: Preoperative EQUATION    Planned Procedure: Procedure(s) (LRB):  LAPAROTOMY, EXPLORATORY (N/A)  LYSIS, ADHESIONS (N/A)  REIMPLANTATION, URETER WITH PSOAS HITCH (Left)  CYSTOSCOPY, WITH URETERAL STENT INSERTION (Left)  REPLACEMENT, NEPHROSTOMY TUBE removal removal (Right)  Requested Anesthesia Type:General  Surgeon: Robin Boyd MD  Service: Urology  Known or anticipated Date of Surgery:2/19/2020    Surgeon notes: reviewed    Electronic QUestionnaire Assessment completed via nurse interview with patient.        Triage considerations:     The patient has no apparent active cardiac condition (No unstable coronary Syndrome such as severe unstable angina or recent [<1 month] myocardial infarction, decompensated CHF, severe valvular   SUBJECTIVE:  Nuria Gonzalez is a 43 y.o. female who is here for hypothyroidism. 2021    TELEHEALTH EVALUATION -- [facility] ([facility])    Patient provided verbal consent to use the video visit. HPI:    Nuria Gonzalez (:  1985) has requested an audio/video evaluation for the following concern(s):      1. Acquired hypothyroidism     This started in . Patient was diagnosed with hypothyroidism. The problem has been unchanged. Patient started medication in . Currently patient is on: levothyroxine. Misses  0 doses a month.     Current complaints: fatigue  Takes iron    History of obstructive symptoms: difficulty swallowing No, changes in voice/hoarseness No.     History of radiation to patient's neck: No  Resent iodine exposure: No  Family history includes hypothyroidism. Family history of thyroid cancer: No     Has 8 yo son. 3/3/2021 had a healthy baby boy    2.  IFG  Glucose [id number]  Uncle has diabetes    Past Medical History:   Diagnosis Date    Hypothyroidism      Patient Active Problem List    Diagnosis Date Noted    IFG (impaired fasting glucose) 2019    Impaired intestinal absorption 10/31/2018    Acquired hypothyroidism 2018     Past Surgical History:   Procedure Laterality Date    FERTILITY SURGERY       Family History   Problem Relation Age of Onset    Thyroid Disease Mother     No Known Problems Father     No Known Problems Brother     No Known Problems Son      Social History     Socioeconomic History    Marital status:      Spouse name: None    Number of children: None    Years of education: None    Highest education level: None   Occupational History    None   Tobacco Use    Smoking status: Never Smoker    Smokeless tobacco: Never Used   Vaping Use    Vaping Use: Never used   Substance and Sexual Activity    Alcohol use: No    Drug use: No    Sexual activity: Yes     Partners: Male   Other Topics Concern  disease or significant arrhythmia)    Previous anesthesia records:GETA12/11/19  Airway Present Prior to Hospital Arrival?: No Placement Date: 12/11/19 Placement Time: 0748 Method of Intubation: Direct laryngoscopy Inserted by: CRNA Airway Device: Endotracheal Tube Mask Ventilation: Easy Intubated: Postinduction Blade: Eufemia #3 Style: Cuffed Cuff Inflation: Minimal occlusive pressure Placement Verified By: Auscultation;Capnometry;ETT Condensation Grade: Grade I Complicating Factors: None Findings Post-Intubation: Positive EtCO2;Bilateral breath sounds;Atraumatic/Condition of teeth unchanged Complications: None Breath Sounds: Equal Bilateral Insertion attempts (enter comment if more than 2 attempts): 1 Removal Date: 12/11/19 Removal Time: 0856 , removed via procedure documentation    Airway Placement Date: 12/11/19 Placement Time: 0748 Method of Intubation: Direct laryngoscopy Inserted by: CRNA Airway Device: Endotracheal Tube Style: Cuffed Placement Verified By: Auscultation;Capnometry;ETT Condensation Breath Sounds: Equal Bilateral Insertion attempts (enter comment if more than 2 attempts): 1 Removal Date: 12/11/19 Removal Time: 0856 , removed via procedure documentation    [REMOVED]      Airway - Non-Surgical 12/11/19 0856   Airway Placement Date: 12/11/19 Placement Time: 0856 , created via procedure documentation  Method of Intubation: Direct laryngoscopy Mask Ventilation: Easy Intubated: Postinduction Blade: Shahid #2 Airway Device Size: 7.0 Placement Verified By: Capnometry Complicating Factors: None Secured at: Lips Complications: None Removal Date: 12/11/19 Removal Time: 0909   Airway Placement Date: 12/11/19 Placement Time: 0856 , created via procedure documentation  Method of Intubation: Direct laryngoscopy Airway Device Size: 7.0 Placement Verified By: Capnometry Removal Date: 12/11/19 Removal Time: 0909       Last PCP note: within 3 months , within [facility] Dr. Ryan Lee  Subspecialty notes: Cardiology:   None   Social History Narrative    None     Social Determinants of Health     Financial Resource Strain:     Difficulty of Paying Living Expenses:    Food Insecurity:     Worried About Running Out of Food in the Last Year:     [address] in the Last Year:    Transportation Needs:     Lack of Transportation (Medical):  Lack of Transportation (Non-Medical):    Physical Activity:     Days of Exercise per Week:     Minutes of Exercise per Session:    Stress:     Feeling of Stress :    Social Connections:     Frequency of Communication with Friends and Family:     Frequency of Social Gatherings with Friends and Family:     Attends [employer] Services:     Active Member of Clubs or Organizations:     Attends Club or Organization Meetings:     Marital Status:    Intimate Partner Violence:     Fear of Current or Ex-Partner:     Emotionally Abused:     Physically Abused:     Sexually Abused:      Current Outpatient Medications   Medication Sig Dispense Refill    levonorgestrel-ethinyl estradiol (AVIANE;ALESSE;LESSINA) 0.1-20 MG-MCG per tablet Take 1 tablet by mouth daily      levothyroxine (SYNTHROID) 175 MCG tablet 1 tablet 6 days a week, 1/2 tablet 1 day a week 30 tablet 3     No current facility-administered medications for this visit.      No Known Allergies  Family Status   Relation Name Status    Mother  Alive    Father  Alive    Brother  Alive    Son  Alive       Review of Systems:  Constitutional: has fatigue, no fever, no recent weight gain, no recent weight loss, no changes in appetite  Eyes: no eye pain, no change in vision, no eye redness, no eye irritation, no double vision  Ears, nose, throat: no nasal congestion, no sore throat, no earache, no decrease in hearing, no hoarseness, no dry mouth, no sinus problems, no difficulty swallowing, no neck lumps, no dental problems, no mouth sores, no ringing in ears  Pulmonary: no shortness of breath, no wheezing, no dyspnea on General, Dermatology, Endocrinology, Gastroenterology, Hepatology, Neurosurgery, Pain Management, Urology    Other important co-morbidities: DM2, HLD, HTN and MURTAZA       Hypertension associated with diabetes    Hyperlipidemia    CAD (coronary artery disease)    Sleep apnea    Carotid stenosis, bilateral    NSTEMI (non-ST elevated myocardial infarction)    S/P coronary artery stent placement    Nuclear sclerosis - Right Eye    Primary localized osteoarthrosis, lower leg    Chronic sinusitis    PSC (posterior subcapsular cataract), right    DME (diabetic macular edema)    Refractive error    Pseudophakia    Senile nuclear sclerosis    Type 2 diabetes mellitus with diabetic peripheral angiopathy without gangrene    Diabetic peripheral neuropathy associated with type 2 diabetes mellitus    Cervical arthritis    Neurogenic claudication    Lumbar herniated disc    Fatty liver disease, nonalcoholic    Arthritis of lumbar spine    Chronic pain    Lumbosacral radiculopathy at L5    Ureteral transection of left native kidney    Type 2 diabetes mellitus with stage 2 chronic kidney disease and hypertension    Asthma    Normocytic anemia    Bradycardia    Delayed surgical wound healing    Rectal ulcer    Protein malnutrition    Acute deep vein thrombosis (DVT) of femoral vein of right lower extremity    Impaired functional mobility and endurance    (HFpEF) heart failure with preserved ejection fraction    Alteration in skin integrity    Debility    Chronic combined systolic and diastolic CHF (congestive heart failure)    Nephrostomy tube displaced    Left ureteral injury    Hydronephrosis    Serum albumin decreased    Weakness of both lower extremities    Intraoperative ureteral injury       Tests already available:  No recent tests.             Instructions given. (See in Nurse's note)    Optimization:  Last anesthesia 12/11/19    Medical Opinion Indicated       Sub-specialist consult  exertion, no cough  Cardiovascular: no chest pain, no lower extremity edema, no orthopnea, no intermittent leg claudication, no palpitations  Gastrointestinal: no abdominal pain, no nausea, no vomiting, no diarrhea, no constipation, no heartburn, no bloating  Genitourinary: no dysuria, no urinary incontinence, no urinary hesitancy, no change in urinary frequency, no feelings of urinary urgency, no nocturia  Musculoskeletal: no joint swelling, no joint stiffness, no joint pain, no muscle cramps, no muscle pain  Integument/Breast: no hair loss, but no skin rashes, no skin lesions, no itching, no dry skin  Neurological: no numbness, no tingling, no weakness, no confusion, no headaches, no dizziness, no fainting, no tremors, no decrease in memory, no balance problems  Psychiatric: no anxiety, no depression, no insomnia  Hematologic/Lymphatic: no tendency for easy bleeding, no swollen lymph nodes, no tendency for easy bruising  Immunology: no seasonal allergies, no frequent infections, no frequent illnesses  Endocrine: no temperature intolerance    OBJECTIVE:     Wt Readings from Last 3 Encounters:   10/30/20 169 lb (76.7 kg)   09/24/20 159 lb (72.1 kg)   01/10/20 147 lb 3.2 oz (66.8 kg)       OBJECTIVE:  Constitutional: no apparent distress, well developed and well nourished  Mental status: alert and awake, oriented to person, place and time, able to follow commands  Psychiatric: judgement and insight and normal, recent and remote memory are intact, mood and affect are normal  Skin: skin inspection appears normal, no significant exanthematous lesions or discoloration noted on facial skin  Head and Face: head and face inspection revealed no abnormalities, normocephalic, atraumatic  Eyes: no lid or conjunctival swelling, erythema or discharge, sclera appears normal  Ears/Nose: external inspection of ears and nose revealed no abnormalities, hearing is grossly normal  Oropharynx/Mouth/Face: lips are normal with no lesions, indicated:   cardiology      Plan:    Testing:  CMP, Hematology Profile and T&S   Pre-anesthesia  visit       Visit focus: recent anesthesia in december     Consultation:Patient's PCP for a statement of optimization  cardiology        Navigation: Tests Scheduled.              Consults scheduled.             Results will be tracked by Preop Clinic.    2/13/2020-Labs reviewed and noted.   2/13/2020-Per  (PCP)-  Plan:   Surgery is not emergent.  Patient has no active cardiac conditions. Surgery is not low risk.  Patient does not have greater than 4 METs.  Agree with stress test as ordered by Cardiology.    2/13/2020-Per  (Cards)-Patient is an intermediate risk ( RCRI 10%) for and intermediate risk type surgery.  Given her complex history and unresolved issue of LV dysfunction following type 2 MI after her last prolonged hospitalization I scheduled her for Dobutamine stress echo with [facility]. Clearance is pending. URIEL Chavez      Addend:  2/17/2020-Per -Patient is cleared for surgery. See chart. URIEL Chavez                  the voice quality was normal  Neck: neck is symmetric, no visualized mass  Pulmonary/chest: respiratory effort normal, no generalized signs of difficulty breathing or signs of respiratory distress  Musculoskeletal: normal station, normal range of motion of neck  Neurological: no facial asymmetry, normal general cortical function      Lab Review:  Lab Results   Component Value Date    TSH 0.214 09/09/2021     No results found for: FREET4     ASSESSMENT/PLAN:    1. Acquired hypothyroidism  Next appointment in person  Uncontrolled  TSH 0.75-7.4-0.1250.0682.890.391-1.05-0.737-0.639-0.56-0.320. 214   Started BCP prior to previous appointment    Decrease levothyroxine to 0.175 mg 6 days a week, 1/2 tablet 1 day a week  On BCP      No palpitations, insomnia, anxiety    - T4, Free; Future  - TSH without Reflex; Future  Gluten free diet     2. IFG  Glucose 884-0855211  Diet, exercise. HbA1C 5.35.45.25.5    3/3/2021 had a healthy baby boy    Reviewed and/or ordered clinical lab results Yes  Reviewed and/or ordered radiology tests No   Reviewed and/or ordered other diagnostic tests No  Discussed test results with performing physician No  Independently reviewed image, tracing, or specimen No  Made a decision to obtain old records No  Reviewed and summarized old records No  Obtained history from other than patient No    Cristy Gerardo was counseled regarding symptoms of hypothyroidism, IFG course and complications of disease if inadequately treated, side effects of medications, diagnosis, treatment options, labs, imaging and other studies and treatment targets and goals, TSH target  She understands instructions and counseling. Cristy Gerardo is a 43 y.o. female being evaluated by a Virtual Visit (video visit) encounter, including two-way audio and video communication, in lieu of an in-person visit due to coronavirus emergency, to address concerns as mentioned in history and assessment and plan.      Patient identification was verified at the start of the visit. I conducted an interview, performed a limited exam by video and educated the patient on my assessment and plan. Due to this being a TeleHealth encounter (During [facility] public health emergency), evaluation of the following organ systems was limited: Vitals/Constitutional/EENT/Resp/CV/GI//MS/Neuro/Skin/Heme-Lymph-Imm. Pursuant to the emergency declaration under the [address] [place] [address], [address] [place] [address] and the [employer] Act, this Virtual Visit was conducted with patient's (and/or legal guardian's) consent, to reduce the patient's risk of exposure to COVID-19 and provide necessary medical care. The patient (and/or legal guardian) has also been advised to contact this office for worsening conditions or problems, and seek emergency medical treatment and/or call 911 if deemed necessary. Total time spent on this encounter via Telehealth (synchronous, real-time audio/visual connection): 20 min    See assessment, plan and counseling note for counseling and care coordination details. Services were provided through a video synchronous discussion virtually to substitute for in-person clinic visit. Persons participating in the telehealth service: provider - Carlyon Denver, MD and patient Roseanna Martins. Provider was located at her office. Patient was located at home. --Carlyon Denver, MD on 9/13/2021 at 7:51 AM    An electronic signature was used to authenticate this note. Return in about 3 months (around 12/13/2021) for thyroid problems.

## 2022-01-11 ENCOUNTER — OFFICE VISIT (OUTPATIENT)
Dept: ENDOCRINOLOGY | Age: 37
End: 2022-01-11
Payer: COMMERCIAL

## 2022-01-11 VITALS
BODY MASS INDEX: 23.4 KG/M2 | TEMPERATURE: 98 F | DIASTOLIC BLOOD PRESSURE: 80 MMHG | HEIGHT: 69 IN | SYSTOLIC BLOOD PRESSURE: 130 MMHG | HEART RATE: 60 BPM | RESPIRATION RATE: 14 BRPM | OXYGEN SATURATION: 99 % | WEIGHT: 158 LBS

## 2022-01-11 DIAGNOSIS — R73.01 IFG (IMPAIRED FASTING GLUCOSE): ICD-10-CM

## 2022-01-11 DIAGNOSIS — E03.9 ACQUIRED HYPOTHYROIDISM: Primary | ICD-10-CM

## 2022-01-11 PROCEDURE — 1036F TOBACCO NON-USER: CPT | Performed by: INTERNAL MEDICINE

## 2022-01-11 PROCEDURE — G8484 FLU IMMUNIZE NO ADMIN: HCPCS | Performed by: INTERNAL MEDICINE

## 2022-01-11 PROCEDURE — G8427 DOCREV CUR MEDS BY ELIG CLIN: HCPCS | Performed by: INTERNAL MEDICINE

## 2022-01-11 PROCEDURE — 99213 OFFICE O/P EST LOW 20 MIN: CPT | Performed by: INTERNAL MEDICINE

## 2022-01-11 PROCEDURE — G8420 CALC BMI NORM PARAMETERS: HCPCS | Performed by: INTERNAL MEDICINE

## 2022-01-11 RX ORDER — LEVOTHYROXINE SODIUM 175 UG/1
TABLET ORAL
Qty: 30 TABLET | Refills: 3 | Status: SHIPPED | OUTPATIENT
Start: 2022-01-11 | End: 2022-04-14 | Stop reason: SDUPTHER

## 2022-01-11 NOTE — PROGRESS NOTES
SUBJECTIVE:  Temo Landin is a 42 y.o. female who is here for hypothyroidism. 1. Acquired hypothyroidism     This started in 2011. Patient was diagnosed with hypothyroidism. The problem has been unchanged. Patient started medication in 2011. Currently patient is on: levothyroxine. Misses  0 doses a month.     Current complaints: fatigue, mild     History of obstructive symptoms: difficulty swallowing No, changes in voice/hoarseness No.     History of radiation to patient's neck: No  Resent iodine exposure: No  Family history includes hypothyroidism. Family history of thyroid cancer: No     Has 4 yo old and 5 months old sons.     2. Impaired fasting glucose  Eats healthy, active    Past Medical History:   Diagnosis Date    Hypothyroidism      Patient Active Problem List    Diagnosis Date Noted    IFG (impaired fasting glucose) 07/30/2019    Impaired intestinal absorption 10/31/2018    Acquired hypothyroidism 03/19/2018     Past Surgical History:   Procedure Laterality Date    FERTILITY SURGERY       Family History   Problem Relation Age of Onset    Thyroid Disease Mother     No Known Problems Father     No Known Problems Brother     No Known Problems Son      Social History     Socioeconomic History    Marital status:      Spouse name: None    Number of children: None    Years of education: None    Highest education level: None   Occupational History    None   Tobacco Use    Smoking status: Never Smoker    Smokeless tobacco: Never Used   Vaping Use    Vaping Use: Never used   Substance and Sexual Activity    Alcohol use: No    Drug use: No    Sexual activity: Yes     Partners: Male   Other Topics Concern    None   Social History Narrative    None     Social Determinants of Health     Financial Resource Strain:     Difficulty of Paying Living Expenses: Not on file   Food Insecurity:     Worried About Running Out of Food in the Last Year: Not on file    Henrique of Food in the Last Year: Not on file   Transportation Needs:     Lack of Transportation (Medical): Not on file    Lack of Transportation (Non-Medical): Not on file   Physical Activity:     Days of Exercise per Week: Not on file    Minutes of Exercise per Session: Not on file   Stress:     Feeling of Stress : Not on file   Social Connections:     Frequency of Communication with Friends and Family: Not on file    Frequency of Social Gatherings with Friends and Family: Not on file    Attends Rastafari Services: Not on file    Active Member of [address] [place] [address] [employer] or Organizations: Not on file    Attends Club or Organization Meetings: Not on file    Marital Status: Not on file   Intimate Partner Violence:     Fear of Current or Ex-Partner: Not on file    Emotionally Abused: Not on file    Physically Abused: Not on file    Sexually Abused: Not on file   Housing Stability:     Unable to Pay for Housing in the Last Year: Not on file    Number of [place] in the Last Year: Not on file    Unstable Housing in the Last Year: Not on file     Current Outpatient Medications   Medication Sig Dispense Refill    levothyroxine (SYNTHROID) 175 MCG tablet 1 tablet 6 days a week, 1/2 tablet 1 day a week 30 tablet 3    levonorgestrel-ethinyl estradiol (AVIANE;ALESSE;LESSINA) 0.1-20 MG-MCG per tablet Take 1 tablet by mouth daily       No current facility-administered medications for this visit.      No Known Allergies  Family Status   Relation Name Status    Mother  Alive    Father  Alive    Brother  Alive    Son  Alive       Review of Systems:  Constitutional: has fatigue, no fever, no recent weight gain, no recent weight loss, no changes in appetite  Eyes: no eye pain, no change in vision, no eye redness, no eye irritation, no double vision  Ears, nose, throat: no nasal congestion, no sore throat, no earache, no decrease in hearing, no hoarseness, no dry mouth, no sinus problems, no difficulty swallowing, no neck lumps, no dental problems, no mouth sores, no ringing in ears  Pulmonary: no shortness of breath, no wheezing, no dyspnea on exertion, no cough  Cardiovascular: no chest pain, no lower extremity edema, no orthopnea, no intermittent leg claudication, no palpitations  Gastrointestinal: no abdominal pain, no nausea, no vomiting, no diarrhea, no constipation, no heartburn, no bloating  Genitourinary: no dysuria, no urinary incontinence, no urinary hesitancy, no change in urinary frequency, no feelings of urinary urgency, no nocturia  Musculoskeletal: no joint swelling, no joint stiffness, no joint pain, no muscle cramps, no muscle pain  Integument/Breast: no hair loss, but no skin rashes, no skin lesions, no itching, no dry skin  Neurological: no numbness, no tingling, no weakness, no confusion, no headaches, no dizziness, no fainting, no tremors, no decrease in memory, no balance problems  Psychiatric: no anxiety, no depression, no insomnia  Hematologic/Lymphatic: no tendency for easy bleeding, no swollen lymph nodes, no tendency for easy bruising  Immunology: no seasonal allergies, no frequent infections, no frequent illnesses  Endocrine: no temperature intolerance    OBJECTIVE:   /80   Pulse 60   Temp 98 °F (36.7 °C)   Resp 14   Ht 5' 9\" (1.753 m)   Wt 158 lb (71.7 kg)   SpO2 99%   BMI 23.33 kg/m²   Wt Readings from Last 3 Encounters:   01/11/22 158 lb (71.7 kg)   10/30/20 169 lb (76.7 kg)   09/24/20 159 lb (72.1 kg)       Physical Exam:  Constitutional: no acute distress, well appearing, well nourished  Psychiatric: oriented to person, place and time, judgement, insight and normal, recent and remote memory and intact and mood, affect are normal  Skin: skin and subcutaneous tissue is normal without mass, normal turgor  Head and Face: examination of head and face revealed no abnormalities  Eyes: no lid or conjunctival swelling, no erythema or discharge, pupils are normal, equal, round, and reactive to light  Ears/Nose: external inspection of ears and nose revealed no abnormalities, hearing is grossly normal  Oropharynx/Mouth/Face: lips, tongue and gums are normal with no lesions, the voice quality was normal  Neck: neck is supple and symmetric, with midline trachea and no masses, thyroid is normal  Lymphatics: normal cervical lymph nodes, normal supraclavicular nodes  Pulmonary: no increased work of breathing or signs of respiratory distress, lungs are clear to auscultation  Cardiovascular: normal heart rate and rhythm, normal S1 and S2, no murmurs and pedal pulses and 2+ bilaterally, No edema  Abdomen: abdomen is soft, non-tender with no masses  Musculoskeletal: normal gait and station, exam of the digits and nails are normal  Neurological: normal coordination, normal general cortical function    Lab Review:  Lab Results   Component Value Date    TSH 4.850 01/05/2022     No results found for: FREET4     ASSESSMENT/PLAN:    1. Acquired hypothyroidism  Forgets to take medication  Very busy  Uncontrolled, worsening  TSH 0.75-7.4-0.125-0.068-2.89-0.391-1.05-0.737-0.639-0.56-0.32-0.214 -4.85  Started BCP prior to previous appointment     Continue Levothyroxine 0.175 mg 6 days a week, 1/2 tablet 1 day a week  On BCP       No palpitations, insomnia, anxiety     - T4, Free; Future  - TSH without Reflex; Future  Gluten free diet     2. IFG  Glucose 933--93-95  Diet, exercise.   HbA1C 5.3-5.4-5.2-5.5       Reviewed and/or ordered clinical lab results Yes  Reviewed and/or ordered radiology tests No   Reviewed and/or ordered other diagnostic tests No  Discussed test results with performing physician No  Independently reviewed image, tracing, or specimen No  Made a decision to obtain old records No  Reviewed and summarized old records No  Obtained history from other than patient No    Kelly Ingram was counseled regarding symptoms of hypothyroidism, hypothyroidism and pregnancy diagnosis, course and complications of disease if inadequately treated, side effects of medications, diagnosis, treatment options, labs, imaging and other studies and treatment targets and goals, TSH target, diet, complications of uncontrolled hypothyroidism  She understands instructions and counseling. Total time I spent on this enocounter 20 min    Return in about 3 months (around 4/11/2022) for thyroid problems.

## 2022-04-06 LAB
ALBUMIN/GLOBULIN RATIO: 1.8 RATIO (ref 0.8–2.6)
ALBUMIN: 4.6 G/DL (ref 3.5–5.2)
ALP BLD-CCNC: 33 U/L (ref 23–144)
ALT SERPL-CCNC: 13 U/L (ref 0–60)
AST SERPL-CCNC: 15 U/L (ref 0–55)
BILIRUB SERPL-MCNC: 0.4 MG/DL (ref 0–1.2)
BUN BLDV-MCNC: 13 MG/DL (ref 3–29)
BUN/CREAT BLD: 19 (ref 7–25)
CALCIUM SERPL-MCNC: 9 MG/DL (ref 8.5–10.5)
CHLORIDE BLD-SCNC: 106 MEQ/L (ref 96–110)
CO2: 23 MEQ/L (ref 19–32)
CREAT SERPL-MCNC: 0.7 MG/DL (ref 0.5–1.2)
GLOBULIN: 2.5 G/DL (ref 1.9–3.6)
GLOMERULAR FILTRATION RATE: 113 MLS/MIN/1.73M2
GLUCOSE BLD-MCNC: 88 MG/DL (ref 70–99)
POTASSIUM SERPL-SCNC: 4.6 MEQ/L (ref 3.4–5.3)
SODIUM BLD-SCNC: 139 MEQ/L (ref 135–148)
STATUS: NORMAL
T4 FREE: 2.19 NG/DL (ref 0.8–1.8)
TOTAL PROTEIN: 7.1 G/DL (ref 6–8.3)
TSH SERPL DL<=0.05 MIU/L-ACNC: 0.12 MCIU/ML (ref 0.4–4.5)

## 2022-04-14 ENCOUNTER — OFFICE VISIT (OUTPATIENT)
Dept: ENDOCRINOLOGY | Age: 37
End: 2022-04-14
Payer: COMMERCIAL

## 2022-04-14 VITALS
HEART RATE: 58 BPM | WEIGHT: 155 LBS | HEIGHT: 69 IN | RESPIRATION RATE: 14 BRPM | TEMPERATURE: 98 F | DIASTOLIC BLOOD PRESSURE: 74 MMHG | OXYGEN SATURATION: 98 % | BODY MASS INDEX: 22.96 KG/M2 | SYSTOLIC BLOOD PRESSURE: 138 MMHG

## 2022-04-14 DIAGNOSIS — E03.9 ACQUIRED HYPOTHYROIDISM: Primary | ICD-10-CM

## 2022-04-14 DIAGNOSIS — R73.01 IFG (IMPAIRED FASTING GLUCOSE): ICD-10-CM

## 2022-04-14 PROCEDURE — 99213 OFFICE O/P EST LOW 20 MIN: CPT | Performed by: INTERNAL MEDICINE

## 2022-04-14 RX ORDER — LEVOTHYROXINE SODIUM 175 UG/1
175 TABLET ORAL
Qty: 30 TABLET | Refills: 3 | Status: SHIPPED | OUTPATIENT
Start: 2022-04-14 | End: 2022-07-29 | Stop reason: SDUPTHER

## 2022-04-14 RX ORDER — LEVOTHYROXINE SODIUM 0.15 MG/1
150 TABLET ORAL
Qty: 30 TABLET | Refills: 3 | Status: SHIPPED | OUTPATIENT
Start: 2022-04-14 | End: 2022-07-29 | Stop reason: SDUPTHER

## 2022-04-14 NOTE — PROGRESS NOTES
SUBJECTIVE:  Sameer Gamez is a 42 y.o. female who is here for hypothyroidism. 1. Acquired hypothyroidism     This started in 2011. Patient was diagnosed with hypothyroidism. The problem has been unchanged. Patient started medication in 2011. Currently patient is on: levothyroxine. Misses  0 doses a month.     Current complaints: fatigue, mild     History of obstructive symptoms: difficulty swallowing No, changes in voice/hoarseness No.     History of radiation to patient's neck: No  Resent iodine exposure: No  Family history includes hypothyroidism. Family history of thyroid cancer: No     Has 4 yo old and 12 months old sons.     2. Impaired fasting glucose  Eats healthy, active    Past Medical History:   Diagnosis Date    Hypothyroidism      Patient Active Problem List    Diagnosis Date Noted    IFG (impaired fasting glucose) 07/30/2019    Impaired intestinal absorption 10/31/2018    Acquired hypothyroidism 03/19/2018     Past Surgical History:   Procedure Laterality Date    FERTILITY SURGERY       Family History   Problem Relation Age of Onset    Thyroid Disease Mother     No Known Problems Father     No Known Problems Brother     No Known Problems Son      Social History     Socioeconomic History    Marital status:      Spouse name: None    Number of children: None    Years of education: None    Highest education level: None   Occupational History    None   Tobacco Use    Smoking status: Never Smoker    Smokeless tobacco: Never Used   Vaping Use    Vaping Use: Never used   Substance and Sexual Activity    Alcohol use: No    Drug use: No    Sexual activity: Yes     Partners: Male   Other Topics Concern    None   Social History Narrative    None     Social Determinants of Health     Financial Resource Strain:     Difficulty of Paying Living Expenses: Not on file   Food Insecurity:     Worried About Running Out of Food in the Last Year: Not on file    [place] of Food in the Last Year: Not on file   Transportation Needs:     Lack of Transportation (Medical): Not on file    Lack of Transportation (Non-Medical): Not on file   Physical Activity:     Days of Exercise per Week: Not on file    Minutes of Exercise per Session: Not on file   Stress:     Feeling of Stress : Not on file   Social Connections:     Frequency of Communication with Friends and Family: Not on file    Frequency of Social Gatherings with Friends and Family: Not on file    Attends Orthodox Services: Not on file    Active Member of [address] [place] [address] [employer] or Organizations: Not on file    Attends Club or Organization Meetings: Not on file    Marital Status: Not on file   Intimate Partner Violence:     Fear of Current or Ex-Partner: Not on file    Emotionally Abused: Not on file    Physically Abused: Not on file    Sexually Abused: Not on file   Housing Stability:     Unable to Pay for Housing in the Last Year: Not on file    Number of [place] in the Last Year: Not on file    Unstable Housing in the Last Year: Not on file     Current Outpatient Medications   Medication Sig Dispense Refill    levothyroxine (SYNTHROID) 175 MCG tablet Take 1 tablet by mouth Twice a Week 30 tablet 3    levothyroxine (SYNTHROID) 150 MCG tablet Take 1 tablet by mouth Five times weekly 30 tablet 3    levonorgestrel-ethinyl estradiol (AVIANE;ALESSE;LESSINA) 0.1-20 MG-MCG per tablet Take 1 tablet by mouth daily       No current facility-administered medications for this visit.      No Known Allergies  Family Status   Relation Name Status    Mother  Alive    Father  Alive    Brother  Alive    Son  Alive       Review of Systems:  Constitutional: has fatigue, no fever, no recent weight gain, no recent weight loss, no changes in appetite  Eyes: no eye pain, no change in vision, no eye redness, no eye irritation, no double vision  Ears, nose, throat: no nasal congestion, no sore throat, no earache, no decrease in hearing, no hoarseness, no dry mouth, no sinus problems, no difficulty swallowing, no neck lumps, no dental problems, no mouth sores, no ringing in ears  Pulmonary: no shortness of breath, no wheezing, no dyspnea on exertion, no cough  Cardiovascular: no chest pain, no lower extremity edema, no orthopnea, no intermittent leg claudication, no palpitations  Gastrointestinal: no abdominal pain, no nausea, no vomiting, no diarrhea, no constipation, no heartburn, no bloating  Genitourinary: no dysuria, no urinary incontinence, no urinary hesitancy, no change in urinary frequency, no feelings of urinary urgency, no nocturia  Musculoskeletal: no joint swelling, no joint stiffness, no joint pain, no muscle cramps, no muscle pain  Integument/Breast: no hair loss, but no skin rashes, no skin lesions, no itching, no dry skin  Neurological: no numbness, no tingling, no weakness, no confusion, no headaches, no dizziness, no fainting, no tremors, no decrease in memory, no balance problems  Psychiatric: no anxiety, no depression, no insomnia  Hematologic/Lymphatic: no tendency for easy bleeding, no swollen lymph nodes, no tendency for easy bruising  Immunology: no seasonal allergies, no frequent infections, no frequent illnesses  Endocrine: no temperature intolerance    OBJECTIVE:   /74   Pulse 58   Temp 98 °F (36.7 °C)   Resp 14   Ht 5' 9\" (1.753 m)   Wt 155 lb (70.3 kg)   SpO2 98%   BMI 22.89 kg/m²   Wt Readings from Last 3 Encounters:   04/14/22 155 lb (70.3 kg)   01/11/22 158 lb (71.7 kg)   10/30/20 169 lb (76.7 kg)       Physical Exam:  Constitutional: no acute distress, well appearing, well nourished  Psychiatric: oriented to person, place and time, judgement, insight and normal, recent and remote memory and intact and mood, affect are normal  Skin: skin and subcutaneous tissue is normal without mass, normal turgor  Head and Face: examination of head and face revealed no abnormalities  Eyes: no lid or conjunctival swelling, no erythema or discharge, pupils are normal, equal, round, and reactive to light  Ears/Nose: external inspection of ears and nose revealed no abnormalities, hearing is grossly normal  Oropharynx/Mouth/Face: lips, tongue and gums are normal with no lesions, the voice quality was normal  Neck: neck is supple and symmetric, with midline trachea and no masses, thyroid is normal  Lymphatics: normal cervical lymph nodes, normal supraclavicular nodes  Pulmonary: no increased work of breathing or signs of respiratory distress, lungs are clear to auscultation  Cardiovascular: normal heart rate and rhythm, normal S1 and S2, no murmurs and pedal pulses and 2+ bilaterally, No edema  Abdomen: abdomen is soft, non-tender with no masses  Musculoskeletal: normal gait and station, exam of the digits and nails are normal  Neurological: normal coordination, normal general cortical function    Lab Review:  Lab Results   Component Value Date    TSH 0.124 04/06/2022     No results found for: FREET4     ASSESSMENT/PLAN:    1. Acquired hypothyroidism    Uncontrolled, worsening  TSH 0.75-7.4-0.125-0.068-2.89-0.391-1.05-0.737-0.639-0.56-0.32-0.214 -4.85-0.124  Started BCP prior to previous appointment     Continue Levothyroxine 0.175 mg 6 days a week, 1/2 tablet 1 day a week  On BCP       No palpitations, insomnia, anxiety     - T4, Free; Future  - TSH without Reflex; Future  Gluten free diet     2. IFG  Glucose 414--93-95-88  Diet, exercise.   HbA1C 5.3-5.4-5.2-5.5       Reviewed and/or ordered clinical lab results Yes  Reviewed and/or ordered radiology tests No   Reviewed and/or ordered other diagnostic tests No  Discussed test results with performing physician No  Independently reviewed image, tracing, or specimen No  Made a decision to obtain old records No  Reviewed and summarized old records No  Obtained history from other than patient No    Nevaeh Biswas was counseled regarding symptoms of hypothyroidism, hypothyroidism and pregnancy diagnosis, course and complications of disease if inadequately treated, side effects of medications, diagnosis, treatment options, labs, imaging and other studies and treatment targets and goals, TSH target, diet, complications of uncontrolled hypothyroidism  She understands instructions and counseling. Total time I spent on this enocounter 20 min    Return in about 3 months (around 7/14/2022) for thyroid problems.

## 2022-07-27 LAB
T3 FREE: 2.6 PG/ML (ref 2.3–4.2)
T4 FREE: 1.43 NG/DL (ref 0.8–1.8)
TSH SERPL DL<=0.05 MIU/L-ACNC: 7.08 MCIU/ML (ref 0.4–4.5)

## 2022-07-29 ENCOUNTER — TELEMEDICINE (OUTPATIENT)
Dept: ENDOCRINOLOGY | Age: 37
End: 2022-07-29
Payer: COMMERCIAL

## 2022-07-29 DIAGNOSIS — R73.01 IFG (IMPAIRED FASTING GLUCOSE): ICD-10-CM

## 2022-07-29 DIAGNOSIS — E03.9 ACQUIRED HYPOTHYROIDISM: Primary | ICD-10-CM

## 2022-07-29 PROCEDURE — 99213 OFFICE O/P EST LOW 20 MIN: CPT | Performed by: INTERNAL MEDICINE

## 2022-07-29 RX ORDER — LEVOTHYROXINE SODIUM 175 UG/1
175 TABLET ORAL
Qty: 30 TABLET | Refills: 3 | Status: SHIPPED | OUTPATIENT
Start: 2022-08-01

## 2022-07-29 RX ORDER — LEVOTHYROXINE SODIUM 0.15 MG/1
150 TABLET ORAL
Qty: 30 TABLET | Refills: 3 | Status: SHIPPED | OUTPATIENT
Start: 2022-07-29

## 2022-07-29 NOTE — PROGRESS NOTES
SUBJECTIVE:  Lacey Clifton is a 42 y.o. female who is here for hypothyroidism. 2022    TELEHEALTH EVALUATION -- Audio/Visual (During VQCEQ-05 public health emergency)    Patient provided verbal consent to use the video visit. HPI:    Lacey Clifton (:  1985) has requested an audio/video evaluation for the following concern(s):    1. Acquired hypothyroidism     This started in . Patient was diagnosed with hypothyroidism. The problem has been unchanged. Patient started medication in . Currently patient is on: levothyroxine. Misses  0 doses a month. Current complaints: fatigue, mild     History of obstructive symptoms: difficulty swallowing No, changes in voice/hoarseness No.     History of radiation to patient's neck: No  Resent iodine exposure: No  Family history includes hypothyroidism. Family history of thyroid cancer: No     Has 6 yo old and 12 months old sons.     2. Impaired fasting glucose  Eats healthy, active    Past Medical History:   Diagnosis Date    Hypothyroidism      Patient Active Problem List    Diagnosis Date Noted    IFG (impaired fasting glucose) 2019    Impaired intestinal absorption 10/31/2018    Acquired hypothyroidism 2018     Past Surgical History:   Procedure Laterality Date    FERTILITY SURGERY       Family History   Problem Relation Age of Onset    Thyroid Disease Mother     No Known Problems Father     No Known Problems Brother     No Known Problems Son      Social History     Socioeconomic History    Marital status:      Spouse name: None    Number of children: None    Years of education: None    Highest education level: None   Tobacco Use    Smoking status: Never    Smokeless tobacco: Never   Vaping Use    Vaping Use: Never used   Substance and Sexual Activity    Alcohol use: No    Drug use: No    Sexual activity: Yes     Partners: Male     Current Outpatient Medications   Medication Sig Dispense Refill    levothyroxine (SYNTHROID) 175 MCG tablet Take 1 tablet by mouth Twice a Week 30 tablet 3    levothyroxine (SYNTHROID) 150 MCG tablet Take 1 tablet by mouth Five times weekly 30 tablet 3    levonorgestrel-ethinyl estradiol (AVIANE;ALESSE;LESSINA) 0.1-20 MG-MCG per tablet Take 1 tablet by mouth daily       No current facility-administered medications for this visit.      No Known Allergies  Family Status   Relation Name Status    Mother  Alive    Father  Alive    Brother  Alive    Son  Alive       Review of Systems:  Constitutional: has fatigue, no fever, no recent weight gain, no recent weight loss, no changes in appetite  Eyes: no eye pain, no change in vision, no eye redness, no eye irritation, no double vision  Ears, nose, throat: no nasal congestion, no sore throat, no earache, no decrease in hearing, no hoarseness, no dry mouth, no sinus problems, no difficulty swallowing, no neck lumps, no dental problems, no mouth sores, no ringing in ears  Pulmonary: no shortness of breath, no wheezing, no dyspnea on exertion, no cough  Cardiovascular: no chest pain, no lower extremity edema, no orthopnea, no intermittent leg claudication, no palpitations  Gastrointestinal: no abdominal pain, no nausea, no vomiting, no diarrhea, no constipation, no heartburn, no bloating  Genitourinary: no dysuria, no urinary incontinence, no urinary hesitancy, no change in urinary frequency, no feelings of urinary urgency, no nocturia  Musculoskeletal: no joint swelling, no joint stiffness, no joint pain, no muscle cramps, no muscle pain  Integument/Breast: no hair loss, but no skin rashes, no skin lesions, no itching, no dry skin  Neurological: no numbness, no tingling, no weakness, no confusion, no headaches, no dizziness, no fainting, no tremors, no decrease in memory, no balance problems  Psychiatric: no anxiety, no depression, no insomnia  Hematologic/Lymphatic: no tendency for easy bleeding, no swollen lymph nodes, no tendency for easy bruising  Immunology: no seasonal allergies, no frequent infections, no frequent illnesses  Endocrine: no temperature intolerance    OBJECTIVE:   There were no vitals taken for this visit. Wt Readings from Last 3 Encounters:   04/14/22 155 lb (70.3 kg)   01/11/22 158 lb (71.7 kg)   10/30/20 169 lb (76.7 kg)       OBJECTIVE:  Constitutional: no apparent distress, well developed and well nourished  Mental status: alert and awake, oriented to person, place and time, able to follow commands  Psychiatric: judgement and insight and normal, recent and remote memory are intact, mood and affect are normal  Skin: skin inspection appears normal, no significant exanthematous lesions or discoloration noted on facial skin  Head and Face: head and face inspection revealed no abnormalities, normocephalic, atraumatic  Eyes: no lid or conjunctival swelling, erythema or discharge, sclera appears normal  Ears/Nose: external inspection of ears and nose revealed no abnormalities, hearing is grossly normal  Oropharynx/Mouth/Face: lips are normal with no lesions, the voice quality was normal  Neck: neck is symmetric, no visualized mass  Pulmonary/chest: respiratory effort normal, no generalized signs of difficulty breathing or signs of respiratory distress  Musculoskeletal: normal station, normal range of motion of neck  Neurological: no facial asymmetry, normal general cortical function      Lab Review:  Lab Results   Component Value Date/Time    TSH 7.080 07/27/2022 07:43 AM     No results found for: FREET4     ASSESSMENT/PLAN:    1. Acquired hypothyroidism    Uncontrolled, worsening  TSH 0.75-7.4-0.125-0.068-2.89-0.391-1.05-0.737-0.639-0.56-0.32-0.214 -4.85-0.124-7.08  Misses taking pills  Continue Levothyroxine 0.175 mg 2 days weekly, 0.15 mg 5 times weekly, take in PM  On BCP    No palpitations, insomnia, anxiety     - T4, Free; Future  - TSH without Reflex; Future  Gluten free diet     2. IFG  Glucose 882--93-95-88  Diet, exercise.   HbA1C necessary. Total time spent on this encounter via Telehealth (synchronous, real-time audio/visual connection):  20  min  See assessment, plan and counseling note for counseling and care coordination details. Persons participating in the telehealth service: provider - Calixto Menon MD and patient Majo Garces. Provider was located at her office. Patient was located at home. The patient was located in the state where the provider was licensed to provide care. --Calixto Menon MD on 7/29/2022 at 11:20 AM    An electronic signature was used to authenticate this note. Return in about 3 months (around 10/29/2022) for thyroid problems.

## 2022-08-11 NOTE — PROGRESS NOTES
SUBJECTIVE:  Rhoda Mohamud is a 38 y.o. female who is here for hypothyroidism. 1. Acquired hypothyroidism     This started in 2011. Patient was diagnosed with hypothyroidism. The problem has been unchanged. Patient started medication in 2011. Currently patient is on: levothyroxine. Misses  0 doses a month.     Current complaints: fatigue, mild     History of obstructive symptoms: difficulty swallowing No, changes in voice/hoarseness No.     History of radiation to patient's neck: No  Resent iodine exposure: No  Family history includes hypothyroidism. Family history of thyroid cancer: No     Has 8 yo son.     2. Pregnancy  20 weeks pregnant  Due 3/20/2021    3.  IFG  Glucose 984--11    Past Medical History:   Diagnosis Date    Hypothyroidism      Patient Active Problem List    Diagnosis Date Noted    Pregnancy 10/30/2020    IFG (impaired fasting glucose) 07/30/2019    Impaired intestinal absorption 10/31/2018    Acquired hypothyroidism 03/19/2018     Past Surgical History:   Procedure Laterality Date    FERTILITY SURGERY       Family History   Problem Relation Age of Onset    Thyroid Disease Mother     No Known Problems Father     No Known Problems Brother     No Known Problems Son      Social History     Socioeconomic History    Marital status:      Spouse name: None    Number of children: None    Years of education: None    Highest education level: None   Occupational History    None   Social Needs    Financial resource strain: None    Food insecurity     Worry: None     Inability: None    Transportation needs     Medical: None     Non-medical: None   Tobacco Use    Smoking status: Never Smoker    Smokeless tobacco: Never Used   Substance and Sexual Activity    Alcohol use: No    Drug use: No    Sexual activity: Yes     Partners: Male   Lifestyle    Physical activity     Days per week: None     Minutes per session: None    Stress: None   Relationships    Social connections     Talks on phone: None     Gets together: None     Attends Sikh service: None     Active member of club or organization: None     Attends meetings of clubs or organizations: None     Relationship status: None    Intimate partner violence     Fear of current or ex partner: None     Emotionally abused: None     Physically abused: None     Forced sexual activity: None   Other Topics Concern    None   Social History Narrative    None     Current Outpatient Medications   Medication Sig Dispense Refill    levothyroxine (SYNTHROID) 200 MCG tablet 1 tablet daily 30 tablet 3    cyanocobalamin ([facility] VITAMIN B12) 1000 MCG tablet Take by mouth      Prenatal Multivit-Min-Fe-FA (PRENATAL 1 + IRON PO) Take by mouth      Cholecalciferol (VITAMIN D3) 125 MCG (5000 UT) TABS Take by mouth 3-4 times a week      Pyridoxine HCl  MG TBCR Take by mouth       No current facility-administered medications for this visit.       No Known Allergies  Family Status   Relation Name Status    Mother  Alive    Father  Alive    Brother  Alive    Son  Alive       Review of Systems:  Constitutional: has fatigue, no fever, no recent weight gain, no recent weight loss, no changes in appetite  Eyes: no eye pain, no change in vision, no eye redness, no eye irritation, no double vision  Ears, nose, throat: no nasal congestion, no sore throat, no earache, no decrease in hearing, no hoarseness, no dry mouth, no sinus problems, no difficulty swallowing, no neck lumps, no dental problems, no mouth sores, no ringing in ears  Pulmonary: no shortness of breath, no wheezing, no dyspnea on exertion, no cough  Cardiovascular: no chest pain, no lower extremity edema, no orthopnea, no intermittent leg claudication, no palpitations  Gastrointestinal: no abdominal pain, no nausea, no vomiting, no diarrhea, no constipation, no heartburn, no bloating  Genitourinary: no dysuria, no urinary incontinence, no urinary hesitancy, no change in urinary frequency, no feelings of urinary urgency, no nocturia  Musculoskeletal: no joint swelling, no joint stiffness, no joint pain, no muscle cramps, no muscle pain  Integument/Breast: no hair loss, but no skin rashes, no skin lesions, no itching, no dry skin  Neurological: no numbness, no tingling, no weakness, no confusion, no headaches, no dizziness, no fainting, no tremors, no decrease in memory, no balance problems  Psychiatric: no anxiety, no depression, no insomnia  Hematologic/Lymphatic: no tendency for easy bleeding, no swollen lymph nodes, no tendency for easy bruising  Immunology: no seasonal allergies, no frequent infections, no frequent illnesses  Endocrine: no temperature intolerance    OBJECTIVE:   /62   Pulse 81   Temp 97.2 °F (36.2 °C)   Resp 14   Ht 5' 9\" (1.753 m)   Wt 169 lb (76.7 kg)   LMP 06/13/2020   SpO2 98%   BMI 24.96 kg/m²   Wt Readings from Last 3 Encounters:   10/30/20 169 lb (76.7 kg)   09/24/20 159 lb (72.1 kg)   01/10/20 147 lb 3.2 oz (66.8 kg)       Physical Exam:  Constitutional: no acute distress, well appearing, well nourished  Psychiatric: oriented to person, place and time, judgement, insight and normal, recent and remote memory and intact and mood, affect are normal  Skin: skin and subcutaneous tissue is normal without mass, normal turgor  Head and Face: examination of head and face revealed no abnormalities  Eyes: no lid or conjunctival swelling, no erythema or discharge, pupils are normal, equal, round, and reactive to light  Ears/Nose: external inspection of ears and nose revealed no abnormalities, hearing is grossly normal  Oropharynx/Mouth/Face: lips, tongue and gums are normal with no lesions, the voice quality was normal  Neck: neck is supple and symmetric, with midline trachea and no masses, thyroid is normal  Lymphatics: normal cervical lymph nodes, normal supraclavicular nodes  Pulmonary: no increased work of breathing or signs of respiratory distress, lungs are clear to auscultation  Cardiovascular: normal heart rate and rhythm, normal S1 and S2, no murmurs and pedal pulses and 2+ bilaterally, No edema  Abdomen: abdomen is soft, non-tender with no masses  Musculoskeletal: normal gait and station, exam of the digits and nails are normal  Neurological: normal coordination, normal general cortical function    Lab Review:  Lab Results   Component Value Date    TSH 0.391 09/22/2020     No results found for: FREET4     ASSESSMENT/PLAN:    1. Acquired hypothyroidism  TSH 0.75-7.4-0.125-0.068-2.89-0.391-1.05  Levothyroxine 0.200 mg daily  - T4, Free; Future  - TSH without Reflex; Future  Gluten free diet     2. IFG  Glucose 312--54  Diet, exercise. HbA1C 5.3-5.4-5.2    3. Pregnancy  20 weeks pregnant  Due 3/20/2021      Reviewed and/or ordered clinical lab results Yes  Reviewed and/or ordered radiology tests No   Reviewed and/or ordered other diagnostic tests No  Discussed test results with performing physician No  Independently reviewed image, tracing, or specimen No  Made a decision to obtain old records No  Reviewed and summarized old records No  Obtained history from other than patient No    Bishnu Landis was counseled regarding symptoms of hypothyroidism, hypothyroidism and pregnancy diagnosis, course and complications of disease if inadequately treated, side effects of medications, diagnosis, treatment options, labs, imaging and other studies and treatment targets and goals, TSH target, diet, activity, importance of hypothyroidism management in pregnancy, complications of uncontrolled hypothyroidism  She understands instructions and counseling. Total time 15 min, more than 50% was counseling. Return in about 5 weeks (around 12/4/2020) for thyroid problems. No

## 2022-11-07 LAB
T3 FREE: 3.4 PG/ML (ref 2.3–4.2)
T4 FREE: 2.37 NG/DL (ref 0.8–1.8)
TSH SERPL DL<=0.05 MIU/L-ACNC: 0.03 MCIU/ML (ref 0.4–4.5)

## 2022-11-20 ENCOUNTER — TELEPHONE (OUTPATIENT)
Dept: ENDOCRINOLOGY | Age: 37
End: 2022-11-20